# Patient Record
Sex: FEMALE | Race: WHITE | Employment: OTHER
[De-identification: names, ages, dates, MRNs, and addresses within clinical notes are randomized per-mention and may not be internally consistent; named-entity substitution may affect disease eponyms.]

---

## 2017-02-02 ENCOUNTER — OFFICE VISIT (OUTPATIENT)
Dept: PRIMARY CARE CLINIC | Facility: CLINIC | Age: 67
End: 2017-02-02

## 2017-02-02 VITALS
HEIGHT: 66 IN | TEMPERATURE: 97.4 F | SYSTOLIC BLOOD PRESSURE: 139 MMHG | BODY MASS INDEX: 45.48 KG/M2 | HEART RATE: 71 BPM | DIASTOLIC BLOOD PRESSURE: 72 MMHG | RESPIRATION RATE: 18 BRPM | WEIGHT: 283 LBS

## 2017-02-02 DIAGNOSIS — Z23 NEED FOR VACCINATION WITH 13-POLYVALENT PNEUMOCOCCAL CONJUGATE VACCINE: ICD-10-CM

## 2017-02-02 DIAGNOSIS — M75.21 BICEPS TENDINITIS, RIGHT: Primary | ICD-10-CM

## 2017-02-02 PROCEDURE — 99213 OFFICE O/P EST LOW 20 MIN: CPT | Performed by: NURSE PRACTITIONER

## 2017-02-02 PROCEDURE — 3017F COLORECTAL CA SCREEN DOC REV: CPT | Performed by: NURSE PRACTITIONER

## 2017-02-02 PROCEDURE — 1090F PRES/ABSN URINE INCON ASSESS: CPT | Performed by: NURSE PRACTITIONER

## 2017-02-02 PROCEDURE — 90670 PCV13 VACCINE IM: CPT | Performed by: NURSE PRACTITIONER

## 2017-02-02 PROCEDURE — G8427 DOCREV CUR MEDS BY ELIG CLIN: HCPCS | Performed by: NURSE PRACTITIONER

## 2017-02-02 PROCEDURE — G0009 ADMIN PNEUMOCOCCAL VACCINE: HCPCS | Performed by: NURSE PRACTITIONER

## 2017-02-02 PROCEDURE — 1036F TOBACCO NON-USER: CPT | Performed by: NURSE PRACTITIONER

## 2017-02-02 PROCEDURE — G8598 ASA/ANTIPLAT THER USED: HCPCS | Performed by: NURSE PRACTITIONER

## 2017-02-02 PROCEDURE — G8484 FLU IMMUNIZE NO ADMIN: HCPCS | Performed by: NURSE PRACTITIONER

## 2017-02-02 PROCEDURE — G8417 CALC BMI ABV UP PARAM F/U: HCPCS | Performed by: NURSE PRACTITIONER

## 2017-02-02 PROCEDURE — G8399 PT W/DXA RESULTS DOCUMENT: HCPCS | Performed by: NURSE PRACTITIONER

## 2017-02-02 PROCEDURE — 3014F SCREEN MAMMO DOC REV: CPT | Performed by: NURSE PRACTITIONER

## 2017-02-02 PROCEDURE — 1123F ACP DISCUSS/DSCN MKR DOCD: CPT | Performed by: NURSE PRACTITIONER

## 2017-02-02 PROCEDURE — 4040F PNEUMOC VAC/ADMIN/RCVD: CPT | Performed by: NURSE PRACTITIONER

## 2017-02-02 RX ORDER — TRAMADOL HYDROCHLORIDE 50 MG/1
50 TABLET ORAL EVERY 6 HOURS PRN
Qty: 40 TABLET | Refills: 0 | Status: SHIPPED | OUTPATIENT
Start: 2017-02-02 | End: 2017-02-21 | Stop reason: SDUPTHER

## 2017-02-02 RX ORDER — PREDNISONE 10 MG/1
TABLET ORAL
Qty: 10 TABLET | Refills: 0 | Status: SHIPPED | OUTPATIENT
Start: 2017-02-02 | End: 2017-02-21 | Stop reason: ALTCHOICE

## 2017-02-02 ASSESSMENT — ENCOUNTER SYMPTOMS
COUGH: 0
COLOR CHANGE: 0
SHORTNESS OF BREATH: 0

## 2017-02-21 ENCOUNTER — OFFICE VISIT (OUTPATIENT)
Dept: PRIMARY CARE CLINIC | Facility: CLINIC | Age: 67
End: 2017-02-21

## 2017-02-21 ENCOUNTER — TELEPHONE (OUTPATIENT)
Dept: PRIMARY CARE CLINIC | Facility: CLINIC | Age: 67
End: 2017-02-21

## 2017-02-21 VITALS
BODY MASS INDEX: 44.18 KG/M2 | SYSTOLIC BLOOD PRESSURE: 131 MMHG | HEART RATE: 75 BPM | WEIGHT: 274.9 LBS | TEMPERATURE: 97.7 F | HEIGHT: 66 IN | DIASTOLIC BLOOD PRESSURE: 68 MMHG | RESPIRATION RATE: 18 BRPM

## 2017-02-21 DIAGNOSIS — M79.621 PAIN IN RIGHT UPPER ARM: ICD-10-CM

## 2017-02-21 DIAGNOSIS — M75.21 BICEPS TENDINITIS, RIGHT: Primary | ICD-10-CM

## 2017-02-21 PROCEDURE — G8417 CALC BMI ABV UP PARAM F/U: HCPCS | Performed by: NURSE PRACTITIONER

## 2017-02-21 PROCEDURE — G8598 ASA/ANTIPLAT THER USED: HCPCS | Performed by: NURSE PRACTITIONER

## 2017-02-21 PROCEDURE — G8484 FLU IMMUNIZE NO ADMIN: HCPCS | Performed by: NURSE PRACTITIONER

## 2017-02-21 PROCEDURE — 4040F PNEUMOC VAC/ADMIN/RCVD: CPT | Performed by: NURSE PRACTITIONER

## 2017-02-21 PROCEDURE — 1090F PRES/ABSN URINE INCON ASSESS: CPT | Performed by: NURSE PRACTITIONER

## 2017-02-21 PROCEDURE — G8399 PT W/DXA RESULTS DOCUMENT: HCPCS | Performed by: NURSE PRACTITIONER

## 2017-02-21 PROCEDURE — 3017F COLORECTAL CA SCREEN DOC REV: CPT | Performed by: NURSE PRACTITIONER

## 2017-02-21 PROCEDURE — 99213 OFFICE O/P EST LOW 20 MIN: CPT | Performed by: NURSE PRACTITIONER

## 2017-02-21 PROCEDURE — G8427 DOCREV CUR MEDS BY ELIG CLIN: HCPCS | Performed by: NURSE PRACTITIONER

## 2017-02-21 PROCEDURE — 1123F ACP DISCUSS/DSCN MKR DOCD: CPT | Performed by: NURSE PRACTITIONER

## 2017-02-21 PROCEDURE — 1036F TOBACCO NON-USER: CPT | Performed by: NURSE PRACTITIONER

## 2017-02-21 PROCEDURE — 3014F SCREEN MAMMO DOC REV: CPT | Performed by: NURSE PRACTITIONER

## 2017-02-21 RX ORDER — TRAMADOL HYDROCHLORIDE 50 MG/1
50 TABLET ORAL EVERY 6 HOURS PRN
Qty: 40 TABLET | Refills: 1 | Status: SHIPPED | OUTPATIENT
Start: 2017-02-21 | End: 2017-03-03

## 2017-02-21 ASSESSMENT — ENCOUNTER SYMPTOMS
SHORTNESS OF BREATH: 0
COUGH: 0
COLOR CHANGE: 0
BACK PAIN: 0

## 2017-02-22 ENCOUNTER — OFFICE VISIT (OUTPATIENT)
Dept: ONCOLOGY | Facility: CLINIC | Age: 67
End: 2017-02-22

## 2017-02-22 VITALS
WEIGHT: 274 LBS | TEMPERATURE: 97.2 F | HEART RATE: 69 BPM | HEIGHT: 66 IN | RESPIRATION RATE: 22 BRPM | SYSTOLIC BLOOD PRESSURE: 136 MMHG | DIASTOLIC BLOOD PRESSURE: 69 MMHG | BODY MASS INDEX: 44.03 KG/M2

## 2017-02-22 DIAGNOSIS — R97.0 ELEVATED CEA: ICD-10-CM

## 2017-02-22 DIAGNOSIS — N13.5 URETERAL STRICTURE, RIGHT: ICD-10-CM

## 2017-02-22 DIAGNOSIS — Z85.048 HISTORY OF RECTAL CANCER: Primary | ICD-10-CM

## 2017-02-22 DIAGNOSIS — C20 RECTAL CANCER (HCC): ICD-10-CM

## 2017-02-22 PROCEDURE — G8399 PT W/DXA RESULTS DOCUMENT: HCPCS | Performed by: INTERNAL MEDICINE

## 2017-02-22 PROCEDURE — 1036F TOBACCO NON-USER: CPT | Performed by: INTERNAL MEDICINE

## 2017-02-22 PROCEDURE — G8427 DOCREV CUR MEDS BY ELIG CLIN: HCPCS | Performed by: INTERNAL MEDICINE

## 2017-02-22 PROCEDURE — G8598 ASA/ANTIPLAT THER USED: HCPCS | Performed by: INTERNAL MEDICINE

## 2017-02-22 PROCEDURE — 1090F PRES/ABSN URINE INCON ASSESS: CPT | Performed by: INTERNAL MEDICINE

## 2017-02-22 PROCEDURE — 3017F COLORECTAL CA SCREEN DOC REV: CPT | Performed by: INTERNAL MEDICINE

## 2017-02-22 PROCEDURE — 4040F PNEUMOC VAC/ADMIN/RCVD: CPT | Performed by: INTERNAL MEDICINE

## 2017-02-22 PROCEDURE — 3014F SCREEN MAMMO DOC REV: CPT | Performed by: INTERNAL MEDICINE

## 2017-02-22 PROCEDURE — 99214 OFFICE O/P EST MOD 30 MIN: CPT | Performed by: INTERNAL MEDICINE

## 2017-02-22 PROCEDURE — G8484 FLU IMMUNIZE NO ADMIN: HCPCS | Performed by: INTERNAL MEDICINE

## 2017-02-22 PROCEDURE — G8417 CALC BMI ABV UP PARAM F/U: HCPCS | Performed by: INTERNAL MEDICINE

## 2017-02-22 PROCEDURE — 1123F ACP DISCUSS/DSCN MKR DOCD: CPT | Performed by: INTERNAL MEDICINE

## 2017-02-28 RX ORDER — OSTOMY IRRIGATOR 1 3/4"
EACH MISCELLANEOUS
Qty: 20 BOTTLE | Refills: 5 | Status: SHIPPED | OUTPATIENT
Start: 2017-02-28 | End: 2017-10-05 | Stop reason: SDUPTHER

## 2017-02-28 RX ORDER — OSTOMY SUPPLY 2 1/4"
EACH MISCELLANEOUS
Qty: 20 EACH | Refills: 5 | Status: SHIPPED | OUTPATIENT
Start: 2017-02-28 | End: 2017-10-05 | Stop reason: SDUPTHER

## 2017-03-15 ENCOUNTER — HOSPITAL ENCOUNTER (OUTPATIENT)
Age: 67
Setting detail: SPECIMEN
Discharge: HOME | End: 2017-03-15
Payer: MEDICARE

## 2017-03-15 ENCOUNTER — HOSPITAL ENCOUNTER (OUTPATIENT)
Dept: ULTRASOUND IMAGING | Age: 67
Discharge: HOME | End: 2017-03-15
Attending: PHYSICIAN ASSISTANT | Admitting: PHYSICIAN ASSISTANT
Payer: MEDICARE

## 2017-03-15 DIAGNOSIS — N13.30 HYDROURETERONEPHROSIS: ICD-10-CM

## 2017-03-15 DIAGNOSIS — N18.9 CHRONIC KIDNEY DISEASE, UNSPECIFIED STAGE: ICD-10-CM

## 2017-03-15 DIAGNOSIS — C20 RECTAL CANCER (HCC): ICD-10-CM

## 2017-03-15 LAB
ANION GAP SERPL CALCULATED.3IONS-SCNC: 15 MMOL/L (ref 9–17)
BUN BLDV-MCNC: 23 MG/DL (ref 8–23)
BUN/CREAT BLD: 21 (ref 9–20)
CALCIUM SERPL-MCNC: 9.6 MG/DL (ref 8.6–10.4)
CHLORIDE BLD-SCNC: 101 MMOL/L (ref 98–107)
CO2: 25 MMOL/L (ref 20–31)
CREAT SERPL-MCNC: 1.12 MG/DL (ref 0.5–0.9)
GFR AFRICAN AMERICAN: 59 ML/MIN
GFR NON-AFRICAN AMERICAN: 48 ML/MIN
GFR SERPL CREATININE-BSD FRML MDRD: ABNORMAL ML/MIN/{1.73_M2}
GFR SERPL CREATININE-BSD FRML MDRD: ABNORMAL ML/MIN/{1.73_M2}
GLUCOSE BLD-MCNC: 106 MG/DL (ref 70–99)
POTASSIUM SERPL-SCNC: 3.9 MMOL/L (ref 3.7–5.3)
SODIUM BLD-SCNC: 141 MMOL/L (ref 135–144)

## 2017-03-15 PROCEDURE — 80048 BASIC METABOLIC PNL TOTAL CA: CPT

## 2017-03-15 PROCEDURE — 76770 US EXAM ABDO BACK WALL COMP: CPT

## 2017-03-15 PROCEDURE — 36415 COLL VENOUS BLD VENIPUNCTURE: CPT

## 2017-03-22 ENCOUNTER — OFFICE VISIT (OUTPATIENT)
Dept: UROLOGY | Age: 67
End: 2017-03-22
Payer: MEDICARE

## 2017-03-22 ENCOUNTER — HOSPITAL ENCOUNTER (OUTPATIENT)
Age: 67
Setting detail: SPECIMEN
Discharge: HOME | End: 2017-03-22
Payer: MEDICARE

## 2017-03-22 VITALS
SYSTOLIC BLOOD PRESSURE: 122 MMHG | BODY MASS INDEX: 43.39 KG/M2 | WEIGHT: 270 LBS | DIASTOLIC BLOOD PRESSURE: 82 MMHG | HEIGHT: 66 IN

## 2017-03-22 DIAGNOSIS — R31.29 MICROHEMATURIA: ICD-10-CM

## 2017-03-22 DIAGNOSIS — N13.5 URETERAL OBSTRUCTION, RIGHT: ICD-10-CM

## 2017-03-22 DIAGNOSIS — C18.9 MALIGNANT NEOPLASM OF COLON, UNSPECIFIED PART OF COLON (HCC): ICD-10-CM

## 2017-03-22 DIAGNOSIS — N13.30 HYDROURETERONEPHROSIS: Primary | ICD-10-CM

## 2017-03-22 PROCEDURE — 1036F TOBACCO NON-USER: CPT | Performed by: PHYSICIAN ASSISTANT

## 2017-03-22 PROCEDURE — G8484 FLU IMMUNIZE NO ADMIN: HCPCS | Performed by: PHYSICIAN ASSISTANT

## 2017-03-22 PROCEDURE — G8417 CALC BMI ABV UP PARAM F/U: HCPCS | Performed by: PHYSICIAN ASSISTANT

## 2017-03-22 PROCEDURE — 4040F PNEUMOC VAC/ADMIN/RCVD: CPT | Performed by: PHYSICIAN ASSISTANT

## 2017-03-22 PROCEDURE — G8399 PT W/DXA RESULTS DOCUMENT: HCPCS | Performed by: PHYSICIAN ASSISTANT

## 2017-03-22 PROCEDURE — 88305 TISSUE EXAM BY PATHOLOGIST: CPT

## 2017-03-22 PROCEDURE — 3017F COLORECTAL CA SCREEN DOC REV: CPT | Performed by: PHYSICIAN ASSISTANT

## 2017-03-22 PROCEDURE — 1090F PRES/ABSN URINE INCON ASSESS: CPT | Performed by: PHYSICIAN ASSISTANT

## 2017-03-22 PROCEDURE — 88112 CYTOPATH CELL ENHANCE TECH: CPT

## 2017-03-22 PROCEDURE — G8598 ASA/ANTIPLAT THER USED: HCPCS | Performed by: PHYSICIAN ASSISTANT

## 2017-03-22 PROCEDURE — 1123F ACP DISCUSS/DSCN MKR DOCD: CPT | Performed by: PHYSICIAN ASSISTANT

## 2017-03-22 PROCEDURE — G8427 DOCREV CUR MEDS BY ELIG CLIN: HCPCS | Performed by: PHYSICIAN ASSISTANT

## 2017-03-22 PROCEDURE — 99214 OFFICE O/P EST MOD 30 MIN: CPT | Performed by: PHYSICIAN ASSISTANT

## 2017-03-22 PROCEDURE — 3014F SCREEN MAMMO DOC REV: CPT | Performed by: PHYSICIAN ASSISTANT

## 2017-03-22 ASSESSMENT — ENCOUNTER SYMPTOMS
CONSTIPATION: 0
VOMITING: 0
COUGH: 0
SHORTNESS OF BREATH: 0
ABDOMINAL PAIN: 0
EYE PAIN: 0
ABDOMINAL DISTENTION: 0
NAUSEA: 0
DIARRHEA: 0

## 2017-03-23 LAB
CASE NUMBER:: NORMAL
SPECIMEN DESCRIPTION: NORMAL
SURGICAL PATHOLOGY REPORT: NORMAL

## 2017-03-30 ENCOUNTER — HOSPITAL ENCOUNTER (OUTPATIENT)
Age: 67
Discharge: HOME | End: 2017-03-30
Payer: MEDICARE

## 2017-03-30 LAB
-: ABNORMAL
ALBUMIN SERPL-MCNC: 3.8 G/DL (ref 3.5–5.2)
AMORPHOUS: ABNORMAL
ANION GAP SERPL CALCULATED.3IONS-SCNC: 12 MMOL/L (ref 9–17)
BACTERIA: ABNORMAL
BILIRUBIN URINE: NEGATIVE
BUN BLDV-MCNC: 21 MG/DL (ref 8–23)
BUN/CREAT BLD: 23 (ref 9–20)
CALCIUM SERPL-MCNC: 9.2 MG/DL (ref 8.6–10.4)
CASTS UA: ABNORMAL /LPF
CHLORIDE BLD-SCNC: 103 MMOL/L (ref 98–107)
CO2: 25 MMOL/L (ref 20–31)
COLOR: YELLOW
COMMENT UA: ABNORMAL
CREAT SERPL-MCNC: 0.93 MG/DL (ref 0.5–0.9)
CREATININE URINE: 105.2 MG/DL (ref 28–217)
CRYSTALS, UA: ABNORMAL /HPF
EPITHELIAL CELLS UA: ABNORMAL /HPF (ref 0–25)
GFR AFRICAN AMERICAN: >60 ML/MIN
GFR NON-AFRICAN AMERICAN: 60 ML/MIN
GFR SERPL CREATININE-BSD FRML MDRD: ABNORMAL ML/MIN/{1.73_M2}
GFR SERPL CREATININE-BSD FRML MDRD: ABNORMAL ML/MIN/{1.73_M2}
GLUCOSE BLD-MCNC: 110 MG/DL (ref 70–99)
GLUCOSE URINE: NEGATIVE
HCT VFR BLD CALC: 41.4 % (ref 36–46)
HEMOGLOBIN: 13.2 G/DL (ref 12–16)
KETONES, URINE: NEGATIVE
LEUKOCYTE ESTERASE, URINE: ABNORMAL
MAGNESIUM: 1.8 MG/DL (ref 1.6–2.6)
MCH RBC QN AUTO: 24.8 PG (ref 26–34)
MCHC RBC AUTO-ENTMCNC: 31.8 G/DL (ref 31–37)
MCV RBC AUTO: 78 FL (ref 80–100)
MUCUS: ABNORMAL
NITRITE, URINE: NEGATIVE
OTHER OBSERVATIONS UA: ABNORMAL
PDW BLD-RTO: 16.6 % (ref 12.1–15.2)
PH UA: 5.5 (ref 5–9)
PHOSPHORUS: 3.4 MG/DL (ref 2.6–4.5)
PLATELET # BLD: 193 K/UL (ref 140–450)
PMV BLD AUTO: ABNORMAL FL (ref 6–12)
POTASSIUM SERPL-SCNC: 4 MMOL/L (ref 3.7–5.3)
PROTEIN UA: NEGATIVE
PTH INTACT: 79.88 PG/ML (ref 15–65)
RBC # BLD: 5.31 M/UL (ref 4–5.2)
RBC UA: ABNORMAL /HPF (ref 0–2)
RENAL EPITHELIAL, UA: ABNORMAL /HPF
SODIUM BLD-SCNC: 140 MMOL/L (ref 135–144)
SPECIFIC GRAVITY UA: 1.02 (ref 1.01–1.02)
TOTAL PROTEIN, URINE: 13 MG/DL
TRICHOMONAS: ABNORMAL
TURBIDITY: CLEAR
URIC ACID: 5.7 MG/DL (ref 2.4–5.7)
URINE HGB: ABNORMAL
UROBILINOGEN, URINE: NORMAL
WBC # BLD: 8.4 K/UL (ref 3.5–11)
WBC UA: ABNORMAL /HPF (ref 0–5)
YEAST: ABNORMAL

## 2017-03-30 PROCEDURE — 85027 COMPLETE CBC AUTOMATED: CPT

## 2017-03-30 PROCEDURE — 84550 ASSAY OF BLOOD/URIC ACID: CPT

## 2017-03-30 PROCEDURE — 80069 RENAL FUNCTION PANEL: CPT

## 2017-03-30 PROCEDURE — 83970 ASSAY OF PARATHORMONE: CPT

## 2017-03-30 PROCEDURE — 83735 ASSAY OF MAGNESIUM: CPT

## 2017-03-30 PROCEDURE — 84156 ASSAY OF PROTEIN URINE: CPT

## 2017-03-30 PROCEDURE — 82570 ASSAY OF URINE CREATININE: CPT

## 2017-03-30 PROCEDURE — 36415 COLL VENOUS BLD VENIPUNCTURE: CPT

## 2017-03-30 PROCEDURE — 81001 URINALYSIS AUTO W/SCOPE: CPT

## 2017-04-03 ENCOUNTER — PROCEDURE VISIT (OUTPATIENT)
Dept: UROLOGY | Age: 67
End: 2017-04-03
Payer: MEDICARE

## 2017-04-03 VITALS — BODY MASS INDEX: 43.58 KG/M2 | DIASTOLIC BLOOD PRESSURE: 80 MMHG | SYSTOLIC BLOOD PRESSURE: 138 MMHG | WEIGHT: 270 LBS

## 2017-04-03 DIAGNOSIS — N13.5 URETERAL OBSTRUCTION, RIGHT: ICD-10-CM

## 2017-04-03 DIAGNOSIS — N18.9 CHRONIC KIDNEY DISEASE, UNSPECIFIED STAGE: ICD-10-CM

## 2017-04-03 DIAGNOSIS — N13.30 HYDROURETERONEPHROSIS: ICD-10-CM

## 2017-04-03 DIAGNOSIS — R31.29 MICROHEMATURIA: Primary | ICD-10-CM

## 2017-04-03 PROCEDURE — 99213 OFFICE O/P EST LOW 20 MIN: CPT | Performed by: UROLOGY

## 2017-04-03 PROCEDURE — G8427 DOCREV CUR MEDS BY ELIG CLIN: HCPCS | Performed by: UROLOGY

## 2017-04-03 PROCEDURE — 1123F ACP DISCUSS/DSCN MKR DOCD: CPT | Performed by: UROLOGY

## 2017-04-03 PROCEDURE — G8399 PT W/DXA RESULTS DOCUMENT: HCPCS | Performed by: UROLOGY

## 2017-04-03 PROCEDURE — 3014F SCREEN MAMMO DOC REV: CPT | Performed by: UROLOGY

## 2017-04-03 PROCEDURE — 52000 CYSTOURETHROSCOPY: CPT | Performed by: UROLOGY

## 2017-04-03 PROCEDURE — G8598 ASA/ANTIPLAT THER USED: HCPCS | Performed by: UROLOGY

## 2017-04-03 PROCEDURE — 3017F COLORECTAL CA SCREEN DOC REV: CPT | Performed by: UROLOGY

## 2017-04-03 PROCEDURE — 4040F PNEUMOC VAC/ADMIN/RCVD: CPT | Performed by: UROLOGY

## 2017-04-03 PROCEDURE — 1090F PRES/ABSN URINE INCON ASSESS: CPT | Performed by: UROLOGY

## 2017-04-03 PROCEDURE — G8417 CALC BMI ABV UP PARAM F/U: HCPCS | Performed by: UROLOGY

## 2017-04-03 PROCEDURE — 1036F TOBACCO NON-USER: CPT | Performed by: UROLOGY

## 2017-04-03 ASSESSMENT — ENCOUNTER SYMPTOMS
EYE PAIN: 0
BACK PAIN: 0
SHORTNESS OF BREATH: 0
EYE REDNESS: 0
VOMITING: 0
COUGH: 0
COLOR CHANGE: 0
ABDOMINAL PAIN: 0
WHEEZING: 0
NAUSEA: 0

## 2017-04-12 ENCOUNTER — HOSPITAL ENCOUNTER (OUTPATIENT)
Age: 67
Discharge: HOME | End: 2017-04-12
Payer: MEDICARE

## 2017-04-12 DIAGNOSIS — I10 ESSENTIAL HYPERTENSION: ICD-10-CM

## 2017-04-12 DIAGNOSIS — E78.2 MIXED HYPERLIPIDEMIA: ICD-10-CM

## 2017-04-12 LAB
ABSOLUTE EOS #: 0.1 K/UL (ref 0–0.4)
ABSOLUTE LYMPH #: 1.8 K/UL (ref 1–4.8)
ABSOLUTE MONO #: 0.7 K/UL (ref 0–1)
ALBUMIN SERPL-MCNC: 4 G/DL (ref 3.5–5.2)
ALBUMIN/GLOBULIN RATIO: 1.4 (ref 1–2.5)
ALP BLD-CCNC: 117 U/L (ref 35–104)
ALT SERPL-CCNC: 15 U/L (ref 5–33)
ANION GAP SERPL CALCULATED.3IONS-SCNC: 13 MMOL/L (ref 9–17)
AST SERPL-CCNC: 14 U/L
BASOPHILS # BLD: 1 % (ref 0–2)
BASOPHILS ABSOLUTE: 0.1 K/UL (ref 0–0.2)
BILIRUB SERPL-MCNC: 0.44 MG/DL (ref 0.3–1.2)
BUN BLDV-MCNC: 27 MG/DL (ref 8–23)
BUN/CREAT BLD: 26 (ref 9–20)
CALCIUM SERPL-MCNC: 9.1 MG/DL (ref 8.6–10.4)
CHLORIDE BLD-SCNC: 101 MMOL/L (ref 98–107)
CHOLESTEROL/HDL RATIO: 2.4
CHOLESTEROL: 159 MG/DL
CO2: 28 MMOL/L (ref 20–31)
CREAT SERPL-MCNC: 1.02 MG/DL (ref 0.5–0.9)
CREATININE URINE: 108.9 MG/DL (ref 28–217)
DIFFERENTIAL TYPE: ABNORMAL
EOSINOPHILS RELATIVE PERCENT: 1 % (ref 0–8)
GFR AFRICAN AMERICAN: >60 ML/MIN
GFR NON-AFRICAN AMERICAN: 54 ML/MIN
GFR SERPL CREATININE-BSD FRML MDRD: ABNORMAL ML/MIN/{1.73_M2}
GFR SERPL CREATININE-BSD FRML MDRD: ABNORMAL ML/MIN/{1.73_M2}
GLUCOSE BLD-MCNC: 90 MG/DL (ref 70–99)
HCT VFR BLD CALC: 46.1 % (ref 36–46)
HDLC SERPL-MCNC: 65 MG/DL
HEMOGLOBIN: 14.7 G/DL (ref 12–16)
LDL CHOLESTEROL: 78 MG/DL (ref 0–130)
LYMPHOCYTES # BLD: 15 % (ref 24–44)
MCH RBC QN AUTO: 25.1 PG (ref 26–34)
MCHC RBC AUTO-ENTMCNC: 31.9 G/DL (ref 31–37)
MCV RBC AUTO: 78.6 FL (ref 80–100)
MICROALBUMIN/CREAT 24H UR: <12 MG/L
MICROALBUMIN/CREAT UR-RTO: 11 MCG/MG CREAT
MONOCYTES # BLD: 6 % (ref 0–12)
PDW BLD-RTO: 16.5 % (ref 12.1–15.2)
PLATELET # BLD: 239 K/UL (ref 140–450)
PLATELET ESTIMATE: ABNORMAL
PMV BLD AUTO: ABNORMAL FL (ref 6–12)
POTASSIUM SERPL-SCNC: 3.8 MMOL/L (ref 3.7–5.3)
RBC # BLD: 5.87 M/UL (ref 4–5.2)
RBC # BLD: ABNORMAL 10*6/UL
SEG NEUTROPHILS: 77 % (ref 36–66)
SEGMENTED NEUTROPHILS ABSOLUTE COUNT: 9 K/UL (ref 1.8–7.7)
SODIUM BLD-SCNC: 142 MMOL/L (ref 135–144)
TOTAL PROTEIN: 6.9 G/DL (ref 6.4–8.3)
TRIGL SERPL-MCNC: 82 MG/DL
VLDLC SERPL CALC-MCNC: NORMAL MG/DL (ref 1–30)
WBC # BLD: 11.7 K/UL (ref 3.5–11)
WBC # BLD: ABNORMAL 10*3/UL

## 2017-04-12 PROCEDURE — 85025 COMPLETE CBC W/AUTO DIFF WBC: CPT

## 2017-04-12 PROCEDURE — 80061 LIPID PANEL: CPT

## 2017-04-12 PROCEDURE — 82043 UR ALBUMIN QUANTITATIVE: CPT

## 2017-04-12 PROCEDURE — 36415 COLL VENOUS BLD VENIPUNCTURE: CPT

## 2017-04-12 PROCEDURE — 80053 COMPREHEN METABOLIC PANEL: CPT

## 2017-04-12 PROCEDURE — 82570 ASSAY OF URINE CREATININE: CPT

## 2017-04-26 ENCOUNTER — HOSPITAL ENCOUNTER (EMERGENCY)
Age: 67
Discharge: HOME | End: 2017-04-26
Payer: MEDICARE

## 2017-04-26 VITALS
WEIGHT: 271 LBS | TEMPERATURE: 96.5 F | OXYGEN SATURATION: 98 % | BODY MASS INDEX: 43.74 KG/M2 | RESPIRATION RATE: 18 BRPM | DIASTOLIC BLOOD PRESSURE: 79 MMHG | HEART RATE: 93 BPM | SYSTOLIC BLOOD PRESSURE: 129 MMHG

## 2017-04-26 DIAGNOSIS — G89.29 OTHER CHRONIC PAIN: ICD-10-CM

## 2017-04-26 DIAGNOSIS — M79.601 RIGHT ARM PAIN: Primary | ICD-10-CM

## 2017-04-26 PROCEDURE — 99283 EMERGENCY DEPT VISIT LOW MDM: CPT

## 2017-04-26 RX ORDER — HYDROCODONE BITARTRATE AND ACETAMINOPHEN 5; 325 MG/1; MG/1
1 TABLET ORAL EVERY 8 HOURS PRN
Qty: 10 TABLET | Refills: 0 | Status: SHIPPED | OUTPATIENT
Start: 2017-04-26 | End: 2017-05-03 | Stop reason: ALTCHOICE

## 2017-04-26 ASSESSMENT — PAIN DESCRIPTION - ORIENTATION: ORIENTATION: RIGHT

## 2017-04-26 ASSESSMENT — PAIN DESCRIPTION - LOCATION: LOCATION: ARM

## 2017-04-26 ASSESSMENT — PAIN DESCRIPTION - PAIN TYPE: TYPE: ACUTE PAIN

## 2017-04-26 ASSESSMENT — PAIN DESCRIPTION - DESCRIPTORS: DESCRIPTORS: CONSTANT;ACHING

## 2017-04-26 ASSESSMENT — PAIN SCALES - GENERAL: PAINLEVEL_OUTOF10: 6

## 2017-04-27 ENCOUNTER — TELEPHONE (OUTPATIENT)
Dept: PRIMARY CARE CLINIC | Age: 67
End: 2017-04-27

## 2017-04-28 ASSESSMENT — ENCOUNTER SYMPTOMS
COUGH: 0
RHINORRHEA: 0
DIARRHEA: 0
VOMITING: 0
ABDOMINAL PAIN: 0
WHEEZING: 0
SORE THROAT: 0
EYE DISCHARGE: 0
SHORTNESS OF BREATH: 0
EYE REDNESS: 0
CHEST TIGHTNESS: 0
BLOOD IN STOOL: 0
NAUSEA: 0
CONSTIPATION: 0
BACK PAIN: 0

## 2017-05-03 ENCOUNTER — HOSPITAL ENCOUNTER (OUTPATIENT)
Dept: PHYSICAL THERAPY | Age: 67
Setting detail: THERAPIES SERIES
Discharge: HOME | End: 2017-05-03
Payer: MEDICARE

## 2017-05-03 ENCOUNTER — OFFICE VISIT (OUTPATIENT)
Dept: PRIMARY CARE CLINIC | Age: 67
End: 2017-05-03
Payer: MEDICARE

## 2017-05-03 VITALS
SYSTOLIC BLOOD PRESSURE: 107 MMHG | RESPIRATION RATE: 18 BRPM | TEMPERATURE: 96.4 F | DIASTOLIC BLOOD PRESSURE: 82 MMHG | BODY MASS INDEX: 43.84 KG/M2 | HEART RATE: 97 BPM | WEIGHT: 272.8 LBS | HEIGHT: 66 IN

## 2017-05-03 DIAGNOSIS — I10 ESSENTIAL HYPERTENSION: Primary | ICD-10-CM

## 2017-05-03 DIAGNOSIS — E66.01 MORBID (SEVERE) OBESITY DUE TO EXCESS CALORIES (HCC): ICD-10-CM

## 2017-05-03 DIAGNOSIS — E78.2 MIXED HYPERLIPIDEMIA: ICD-10-CM

## 2017-05-03 DIAGNOSIS — E66.01 MORBID OBESITY WITH BMI OF 40.0-44.9, ADULT (HCC): ICD-10-CM

## 2017-05-03 DIAGNOSIS — F41.9 ANXIETY: ICD-10-CM

## 2017-05-03 PROCEDURE — 97140 MANUAL THERAPY 1/> REGIONS: CPT

## 2017-05-03 PROCEDURE — 1036F TOBACCO NON-USER: CPT | Performed by: NURSE PRACTITIONER

## 2017-05-03 PROCEDURE — 99214 OFFICE O/P EST MOD 30 MIN: CPT | Performed by: NURSE PRACTITIONER

## 2017-05-03 PROCEDURE — G8417 CALC BMI ABV UP PARAM F/U: HCPCS | Performed by: NURSE PRACTITIONER

## 2017-05-03 PROCEDURE — 3014F SCREEN MAMMO DOC REV: CPT | Performed by: NURSE PRACTITIONER

## 2017-05-03 PROCEDURE — 1090F PRES/ABSN URINE INCON ASSESS: CPT | Performed by: NURSE PRACTITIONER

## 2017-05-03 PROCEDURE — G8399 PT W/DXA RESULTS DOCUMENT: HCPCS | Performed by: NURSE PRACTITIONER

## 2017-05-03 PROCEDURE — G8984 CARRY CURRENT STATUS: HCPCS

## 2017-05-03 PROCEDURE — 97162 PT EVAL MOD COMPLEX 30 MIN: CPT

## 2017-05-03 PROCEDURE — G8985 CARRY GOAL STATUS: HCPCS

## 2017-05-03 PROCEDURE — 3017F COLORECTAL CA SCREEN DOC REV: CPT | Performed by: NURSE PRACTITIONER

## 2017-05-03 PROCEDURE — G8428 CUR MEDS NOT DOCUMENT: HCPCS | Performed by: NURSE PRACTITIONER

## 2017-05-03 PROCEDURE — G8598 ASA/ANTIPLAT THER USED: HCPCS | Performed by: NURSE PRACTITIONER

## 2017-05-03 PROCEDURE — 4040F PNEUMOC VAC/ADMIN/RCVD: CPT | Performed by: NURSE PRACTITIONER

## 2017-05-03 PROCEDURE — 1123F ACP DISCUSS/DSCN MKR DOCD: CPT | Performed by: NURSE PRACTITIONER

## 2017-05-03 RX ORDER — HYDROCHLOROTHIAZIDE 12.5 MG/1
12.5 CAPSULE, GELATIN COATED ORAL DAILY
Qty: 90 CAPSULE | Refills: 3 | Status: SHIPPED | OUTPATIENT
Start: 2017-05-03 | End: 2018-04-26 | Stop reason: SDUPTHER

## 2017-05-03 RX ORDER — LOSARTAN POTASSIUM 100 MG/1
100 TABLET ORAL DAILY
Qty: 90 TABLET | Refills: 3 | Status: SHIPPED | OUTPATIENT
Start: 2017-05-03 | End: 2018-04-26 | Stop reason: SDUPTHER

## 2017-05-03 RX ORDER — CLOPIDOGREL BISULFATE 75 MG/1
75 TABLET ORAL DAILY
Qty: 90 TABLET | Refills: 3 | Status: SHIPPED | OUTPATIENT
Start: 2017-05-03 | End: 2018-04-26 | Stop reason: SDUPTHER

## 2017-05-03 RX ORDER — CITALOPRAM 20 MG/1
20 TABLET ORAL DAILY
Qty: 90 TABLET | Refills: 3 | Status: SHIPPED | OUTPATIENT
Start: 2017-05-03 | End: 2018-03-09 | Stop reason: SDUPTHER

## 2017-05-03 ASSESSMENT — ENCOUNTER SYMPTOMS
VOMITING: 0
ABDOMINAL PAIN: 0
COUGH: 0
DIARRHEA: 0
SORE THROAT: 0
RHINORRHEA: 0
WHEEZING: 0
NAUSEA: 0
CONSTIPATION: 0

## 2017-05-03 ASSESSMENT — PAIN SCALES - GENERAL: PAINLEVEL_OUTOF10: 2

## 2017-05-03 ASSESSMENT — PAIN DESCRIPTION - ORIENTATION: ORIENTATION: RIGHT

## 2017-05-03 ASSESSMENT — PAIN DESCRIPTION - DESCRIPTORS: DESCRIPTORS: ACHING

## 2017-05-03 ASSESSMENT — PAIN DESCRIPTION - PAIN TYPE: TYPE: ACUTE PAIN

## 2017-05-03 ASSESSMENT — PAIN DESCRIPTION - LOCATION: LOCATION: SHOULDER

## 2017-05-04 ENCOUNTER — HOSPITAL ENCOUNTER (OUTPATIENT)
Dept: PHYSICAL THERAPY | Age: 67
Setting detail: THERAPIES SERIES
Discharge: HOME | End: 2017-05-04
Payer: MEDICARE

## 2017-05-04 PROCEDURE — 97110 THERAPEUTIC EXERCISES: CPT

## 2017-05-04 PROCEDURE — 97140 MANUAL THERAPY 1/> REGIONS: CPT

## 2017-05-11 ENCOUNTER — HOSPITAL ENCOUNTER (OUTPATIENT)
Dept: PHYSICAL THERAPY | Age: 67
Setting detail: THERAPIES SERIES
Discharge: HOME | End: 2017-05-11
Payer: MEDICARE

## 2017-05-11 PROCEDURE — 97035 APP MDLTY 1+ULTRASOUND EA 15: CPT

## 2017-05-11 PROCEDURE — 97140 MANUAL THERAPY 1/> REGIONS: CPT

## 2017-05-11 PROCEDURE — 97110 THERAPEUTIC EXERCISES: CPT

## 2017-05-11 ASSESSMENT — PAIN DESCRIPTION - LOCATION: LOCATION: SHOULDER

## 2017-05-11 ASSESSMENT — PAIN DESCRIPTION - ORIENTATION: ORIENTATION: RIGHT

## 2017-05-11 ASSESSMENT — PAIN SCALES - GENERAL: PAINLEVEL_OUTOF10: 1

## 2017-05-12 ENCOUNTER — HOSPITAL ENCOUNTER (OUTPATIENT)
Dept: PHYSICAL THERAPY | Age: 67
Setting detail: THERAPIES SERIES
Discharge: HOME | End: 2017-05-12
Payer: MEDICARE

## 2017-05-12 PROCEDURE — 97140 MANUAL THERAPY 1/> REGIONS: CPT

## 2017-05-12 PROCEDURE — 97110 THERAPEUTIC EXERCISES: CPT

## 2017-05-12 PROCEDURE — 97035 APP MDLTY 1+ULTRASOUND EA 15: CPT

## 2017-05-12 ASSESSMENT — PAIN DESCRIPTION - ORIENTATION: ORIENTATION: RIGHT

## 2017-05-12 ASSESSMENT — PAIN DESCRIPTION - LOCATION: LOCATION: SHOULDER

## 2017-05-12 ASSESSMENT — PAIN SCALES - GENERAL: PAINLEVEL_OUTOF10: 1

## 2017-05-15 ENCOUNTER — HOSPITAL ENCOUNTER (OUTPATIENT)
Dept: PHYSICAL THERAPY | Age: 67
Setting detail: THERAPIES SERIES
Discharge: HOME | End: 2017-05-15
Payer: MEDICARE

## 2017-05-15 PROCEDURE — 97035 APP MDLTY 1+ULTRASOUND EA 15: CPT

## 2017-05-15 PROCEDURE — 97110 THERAPEUTIC EXERCISES: CPT

## 2017-05-15 PROCEDURE — 97140 MANUAL THERAPY 1/> REGIONS: CPT

## 2017-05-17 ENCOUNTER — HOSPITAL ENCOUNTER (OUTPATIENT)
Dept: PHYSICAL THERAPY | Age: 67
Setting detail: THERAPIES SERIES
Discharge: HOME | End: 2017-05-17
Payer: MEDICARE

## 2017-05-17 PROCEDURE — 97140 MANUAL THERAPY 1/> REGIONS: CPT

## 2017-05-17 PROCEDURE — 97110 THERAPEUTIC EXERCISES: CPT

## 2017-05-19 ENCOUNTER — HOSPITAL ENCOUNTER (OUTPATIENT)
Dept: PHYSICAL THERAPY | Age: 67
Setting detail: THERAPIES SERIES
Discharge: HOME | End: 2017-05-19
Payer: MEDICARE

## 2017-05-19 PROCEDURE — G8985 CARRY GOAL STATUS: HCPCS

## 2017-05-19 PROCEDURE — 97140 MANUAL THERAPY 1/> REGIONS: CPT

## 2017-05-19 PROCEDURE — G8984 CARRY CURRENT STATUS: HCPCS

## 2017-05-19 PROCEDURE — 97110 THERAPEUTIC EXERCISES: CPT

## 2017-05-22 ENCOUNTER — HOSPITAL ENCOUNTER (OUTPATIENT)
Dept: PHYSICAL THERAPY | Age: 67
Setting detail: THERAPIES SERIES
Discharge: HOME | End: 2017-05-22
Payer: MEDICARE

## 2017-05-22 PROCEDURE — 97140 MANUAL THERAPY 1/> REGIONS: CPT

## 2017-05-22 PROCEDURE — 97110 THERAPEUTIC EXERCISES: CPT

## 2017-05-24 ENCOUNTER — HOSPITAL ENCOUNTER (OUTPATIENT)
Dept: PHYSICAL THERAPY | Age: 67
Setting detail: THERAPIES SERIES
Discharge: HOME | End: 2017-05-24
Payer: MEDICARE

## 2017-05-24 PROCEDURE — 97140 MANUAL THERAPY 1/> REGIONS: CPT

## 2017-05-24 PROCEDURE — 97110 THERAPEUTIC EXERCISES: CPT

## 2017-05-24 ASSESSMENT — PAIN DESCRIPTION - PAIN TYPE: TYPE: ACUTE PAIN

## 2017-05-24 ASSESSMENT — PAIN DESCRIPTION - LOCATION: LOCATION: SHOULDER

## 2017-05-24 ASSESSMENT — PAIN DESCRIPTION - DESCRIPTORS: DESCRIPTORS: ACHING

## 2017-05-24 ASSESSMENT — PAIN SCALES - GENERAL: PAINLEVEL_OUTOF10: 1

## 2017-05-24 ASSESSMENT — PAIN DESCRIPTION - ORIENTATION: ORIENTATION: RIGHT

## 2017-05-25 ENCOUNTER — HOSPITAL ENCOUNTER (OUTPATIENT)
Dept: PHYSICAL THERAPY | Age: 67
Setting detail: THERAPIES SERIES
End: 2017-05-25
Payer: MEDICARE

## 2017-05-30 ENCOUNTER — HOSPITAL ENCOUNTER (OUTPATIENT)
Dept: PHYSICAL THERAPY | Age: 67
Setting detail: THERAPIES SERIES
Discharge: HOME | End: 2017-05-30
Payer: MEDICARE

## 2017-05-30 PROCEDURE — 97140 MANUAL THERAPY 1/> REGIONS: CPT

## 2017-05-30 PROCEDURE — 97110 THERAPEUTIC EXERCISES: CPT

## 2017-05-30 ASSESSMENT — PAIN DESCRIPTION - DESCRIPTORS: DESCRIPTORS: ACHING

## 2017-05-30 ASSESSMENT — PAIN SCALES - GENERAL: PAINLEVEL_OUTOF10: 0

## 2017-05-30 ASSESSMENT — PAIN DESCRIPTION - PAIN TYPE: TYPE: ACUTE PAIN

## 2017-05-30 ASSESSMENT — PAIN DESCRIPTION - LOCATION: LOCATION: SHOULDER

## 2017-05-30 ASSESSMENT — PAIN DESCRIPTION - ORIENTATION: ORIENTATION: RIGHT

## 2017-05-31 ENCOUNTER — HOSPITAL ENCOUNTER (OUTPATIENT)
Dept: PHYSICAL THERAPY | Age: 67
Setting detail: THERAPIES SERIES
Discharge: HOME | End: 2017-05-31
Payer: MEDICARE

## 2017-05-31 PROCEDURE — 97110 THERAPEUTIC EXERCISES: CPT

## 2017-05-31 PROCEDURE — 97140 MANUAL THERAPY 1/> REGIONS: CPT

## 2017-06-02 ENCOUNTER — HOSPITAL ENCOUNTER (OUTPATIENT)
Dept: PHYSICAL THERAPY | Age: 67
Setting detail: THERAPIES SERIES
Discharge: HOME | End: 2017-06-02
Payer: MEDICARE

## 2017-06-02 PROCEDURE — 97110 THERAPEUTIC EXERCISES: CPT

## 2017-06-02 PROCEDURE — 97140 MANUAL THERAPY 1/> REGIONS: CPT

## 2017-07-17 ENCOUNTER — OFFICE VISIT (OUTPATIENT)
Dept: PRIMARY CARE CLINIC | Age: 67
End: 2017-07-17
Payer: MEDICARE

## 2017-07-17 VITALS
TEMPERATURE: 97.4 F | BODY MASS INDEX: 44.93 KG/M2 | SYSTOLIC BLOOD PRESSURE: 122 MMHG | OXYGEN SATURATION: 99 % | HEART RATE: 87 BPM | WEIGHT: 278.4 LBS | RESPIRATION RATE: 20 BRPM | DIASTOLIC BLOOD PRESSURE: 68 MMHG

## 2017-07-17 DIAGNOSIS — J20.9 ACUTE BRONCHITIS WITH ASTHMA: Primary | ICD-10-CM

## 2017-07-17 DIAGNOSIS — J45.909 ACUTE BRONCHITIS WITH ASTHMA: Primary | ICD-10-CM

## 2017-07-17 PROCEDURE — 1123F ACP DISCUSS/DSCN MKR DOCD: CPT | Performed by: NURSE PRACTITIONER

## 2017-07-17 PROCEDURE — 1036F TOBACCO NON-USER: CPT | Performed by: NURSE PRACTITIONER

## 2017-07-17 PROCEDURE — G8417 CALC BMI ABV UP PARAM F/U: HCPCS | Performed by: NURSE PRACTITIONER

## 2017-07-17 PROCEDURE — 3017F COLORECTAL CA SCREEN DOC REV: CPT | Performed by: NURSE PRACTITIONER

## 2017-07-17 PROCEDURE — 1090F PRES/ABSN URINE INCON ASSESS: CPT | Performed by: NURSE PRACTITIONER

## 2017-07-17 PROCEDURE — G8598 ASA/ANTIPLAT THER USED: HCPCS | Performed by: NURSE PRACTITIONER

## 2017-07-17 PROCEDURE — G8399 PT W/DXA RESULTS DOCUMENT: HCPCS | Performed by: NURSE PRACTITIONER

## 2017-07-17 PROCEDURE — 4040F PNEUMOC VAC/ADMIN/RCVD: CPT | Performed by: NURSE PRACTITIONER

## 2017-07-17 PROCEDURE — G8427 DOCREV CUR MEDS BY ELIG CLIN: HCPCS | Performed by: NURSE PRACTITIONER

## 2017-07-17 PROCEDURE — 3014F SCREEN MAMMO DOC REV: CPT | Performed by: NURSE PRACTITIONER

## 2017-07-17 PROCEDURE — 99213 OFFICE O/P EST LOW 20 MIN: CPT | Performed by: NURSE PRACTITIONER

## 2017-07-17 RX ORDER — AMOXICILLIN AND CLAVULANATE POTASSIUM 875; 125 MG/1; MG/1
1 TABLET, FILM COATED ORAL 2 TIMES DAILY
Qty: 20 TABLET | Refills: 0 | Status: SHIPPED | OUTPATIENT
Start: 2017-07-17 | End: 2017-07-27

## 2017-07-17 RX ORDER — BENZONATATE 200 MG/1
200 CAPSULE ORAL 3 TIMES DAILY PRN
Qty: 20 CAPSULE | Refills: 0 | Status: SHIPPED | OUTPATIENT
Start: 2017-07-17 | End: 2017-07-24

## 2017-07-17 RX ORDER — MELOXICAM 7.5 MG/1
7.5 TABLET ORAL DAILY
Qty: 90 TABLET | Refills: 1 | Status: SHIPPED | OUTPATIENT
Start: 2017-07-17 | End: 2018-02-01 | Stop reason: SDUPTHER

## 2017-07-17 ASSESSMENT — ENCOUNTER SYMPTOMS
WHEEZING: 0
DIARRHEA: 0
SORE THROAT: 0
COUGH: 1
VOMITING: 0
HEARTBURN: 0
RHINORRHEA: 0
SHORTNESS OF BREATH: 0
NAUSEA: 0

## 2017-08-04 ENCOUNTER — HOSPITAL ENCOUNTER (OUTPATIENT)
Age: 67
Discharge: HOME | End: 2017-08-04
Payer: MEDICARE

## 2017-08-04 LAB
-: ABNORMAL
AMORPHOUS: ABNORMAL
BACTERIA: ABNORMAL
BILIRUBIN URINE: NEGATIVE
CASTS UA: ABNORMAL /LPF
COLOR: YELLOW
COMMENT UA: ABNORMAL
CREATININE URINE: 187.7 MG/DL (ref 28–217)
CRYSTALS, UA: ABNORMAL /HPF
EPITHELIAL CELLS UA: ABNORMAL /HPF (ref 0–25)
GLUCOSE URINE: NEGATIVE
HCT VFR BLD CALC: 42.2 % (ref 36–46)
HEMOGLOBIN: 13.9 G/DL (ref 12–16)
KETONES, URINE: NEGATIVE
LEUKOCYTE ESTERASE, URINE: ABNORMAL
MAGNESIUM: 2 MG/DL (ref 1.6–2.6)
MCH RBC QN AUTO: 26.5 PG (ref 26–34)
MCHC RBC AUTO-ENTMCNC: 33.1 G/DL (ref 31–37)
MCV RBC AUTO: 80.2 FL (ref 80–100)
MUCUS: ABNORMAL
NITRITE, URINE: POSITIVE
OTHER OBSERVATIONS UA: ABNORMAL
PDW BLD-RTO: 14.6 % (ref 12.1–15.2)
PH UA: 6 (ref 5–9)
PLATELET # BLD: 258 K/UL (ref 140–450)
PMV BLD AUTO: 9 FL (ref 6–12)
PROTEIN UA: ABNORMAL
PTH INTACT: 124.3 PG/ML (ref 15–65)
RBC # BLD: 5.26 M/UL (ref 4–5.2)
RBC UA: ABNORMAL /HPF (ref 0–2)
RENAL EPITHELIAL, UA: ABNORMAL /HPF
SPECIFIC GRAVITY UA: 1.02 (ref 1.01–1.02)
TOTAL PROTEIN, URINE: 35 MG/DL
TRICHOMONAS: ABNORMAL
TURBIDITY: ABNORMAL
URIC ACID: 8.4 MG/DL (ref 2.4–5.7)
URINE HGB: ABNORMAL
UROBILINOGEN, URINE: NORMAL
WBC # BLD: 10.6 K/UL (ref 3.5–11)
WBC UA: ABNORMAL /HPF (ref 0–5)
YEAST: ABNORMAL

## 2017-08-04 PROCEDURE — 80069 RENAL FUNCTION PANEL: CPT

## 2017-08-04 PROCEDURE — 85027 COMPLETE CBC AUTOMATED: CPT

## 2017-08-04 PROCEDURE — 83735 ASSAY OF MAGNESIUM: CPT

## 2017-08-04 PROCEDURE — 82570 ASSAY OF URINE CREATININE: CPT

## 2017-08-04 PROCEDURE — 83970 ASSAY OF PARATHORMONE: CPT

## 2017-08-04 PROCEDURE — 81001 URINALYSIS AUTO W/SCOPE: CPT

## 2017-08-04 PROCEDURE — 84550 ASSAY OF BLOOD/URIC ACID: CPT

## 2017-08-04 PROCEDURE — 84156 ASSAY OF PROTEIN URINE: CPT

## 2017-08-04 PROCEDURE — 36415 COLL VENOUS BLD VENIPUNCTURE: CPT

## 2017-08-08 LAB
ALBUMIN SERPL-MCNC: 3.8 G/DL (ref 3.5–5.2)
ANION GAP SERPL CALCULATED.3IONS-SCNC: 17 MMOL/L (ref 9–17)
BUN BLDV-MCNC: 16 MG/DL (ref 8–23)
BUN/CREAT BLD: 13 (ref 9–20)
CALCIUM SERPL-MCNC: 9.3 MG/DL (ref 8.6–10.4)
CHLORIDE BLD-SCNC: 102 MMOL/L (ref 98–107)
CO2: 26 MMOL/L (ref 20–31)
CREAT SERPL-MCNC: 1.26 MG/DL (ref 0.5–0.9)
GFR AFRICAN AMERICAN: 51 ML/MIN
GFR NON-AFRICAN AMERICAN: 42 ML/MIN
GFR SERPL CREATININE-BSD FRML MDRD: ABNORMAL ML/MIN/{1.73_M2}
GFR SERPL CREATININE-BSD FRML MDRD: ABNORMAL ML/MIN/{1.73_M2}
GLUCOSE BLD-MCNC: 109 MG/DL (ref 70–99)
PHOSPHORUS: 2.1 MG/DL (ref 2.6–4.5)
POTASSIUM SERPL-SCNC: 3.6 MMOL/L (ref 3.7–5.3)
SODIUM BLD-SCNC: 145 MMOL/L (ref 135–144)

## 2017-08-09 ENCOUNTER — HOSPITAL ENCOUNTER (OUTPATIENT)
Dept: LAB | Age: 67
Discharge: HOME | End: 2017-08-09
Attending: INTERNAL MEDICINE
Payer: MEDICARE

## 2017-08-09 ENCOUNTER — HOSPITAL ENCOUNTER (OUTPATIENT)
Dept: CT IMAGING | Age: 67
Discharge: HOME | End: 2017-08-09
Attending: INTERNAL MEDICINE | Admitting: INTERNAL MEDICINE
Payer: MEDICARE

## 2017-08-09 DIAGNOSIS — N13.5 URETERAL STRICTURE, RIGHT: ICD-10-CM

## 2017-08-09 DIAGNOSIS — C20 RECTAL CANCER (HCC): ICD-10-CM

## 2017-08-09 DIAGNOSIS — R97.0 ELEVATED CEA: ICD-10-CM

## 2017-08-09 DIAGNOSIS — Z85.048 HISTORY OF RECTAL CANCER: ICD-10-CM

## 2017-08-09 LAB
ABSOLUTE EOS #: 0.23 K/UL (ref 0–0.4)
ABSOLUTE LYMPH #: 1.54 K/UL (ref 1–4.8)
ABSOLUTE MONO #: 0.46 K/UL (ref 0–1)
ALBUMIN SERPL-MCNC: 3.8 G/DL (ref 3.5–5.2)
ALBUMIN/GLOBULIN RATIO: 1.2 (ref 1–2.5)
ALP BLD-CCNC: 93 U/L (ref 35–104)
ALT SERPL-CCNC: 17 U/L (ref 5–33)
ANION GAP SERPL CALCULATED.3IONS-SCNC: 12 MMOL/L (ref 9–17)
AST SERPL-CCNC: 18 U/L
BASOPHILS # BLD: 1 %
BASOPHILS ABSOLUTE: 0.08 K/UL (ref 0–0.2)
BILIRUB SERPL-MCNC: 0.4 MG/DL (ref 0.3–1.2)
BUN BLDV-MCNC: 16 MG/DL (ref 8–23)
BUN/CREAT BLD: 14 (ref 9–20)
CALCIUM SERPL-MCNC: 9.4 MG/DL (ref 8.6–10.4)
CARCINOEMBRYONIC ANTIGEN: 26.1 NG/ML
CHLORIDE BLD-SCNC: 103 MMOL/L (ref 98–107)
CO2: 27 MMOL/L (ref 20–31)
CREAT SERPL-MCNC: 1.11 MG/DL (ref 0.5–0.9)
DIFFERENTIAL TYPE: ABNORMAL
EOSINOPHILS RELATIVE PERCENT: 3 %
GFR AFRICAN AMERICAN: 59 ML/MIN
GFR NON-AFRICAN AMERICAN: 49 ML/MIN
GFR SERPL CREATININE-BSD FRML MDRD: ABNORMAL ML/MIN/{1.73_M2}
GFR SERPL CREATININE-BSD FRML MDRD: ABNORMAL ML/MIN/{1.73_M2}
GLUCOSE BLD-MCNC: 119 MG/DL (ref 70–99)
HCT VFR BLD CALC: 41.8 % (ref 36–46)
HEMOGLOBIN: 13.4 G/DL (ref 12–16)
LYMPHOCYTES # BLD: 20 %
MCH RBC QN AUTO: 25.4 PG (ref 26–34)
MCHC RBC AUTO-ENTMCNC: 32 G/DL (ref 31–37)
MCV RBC AUTO: 79.4 FL (ref 80–100)
MONOCYTES # BLD: 6 %
MORPHOLOGY: NORMAL
PDW BLD-RTO: 13.5 % (ref 12.1–15.2)
PLATELET # BLD: 244 K/UL (ref 140–450)
PLATELET ESTIMATE: ABNORMAL
PMV BLD AUTO: 8.9 FL (ref 6–12)
POTASSIUM SERPL-SCNC: 3.2 MMOL/L (ref 3.7–5.3)
RBC # BLD: 5.27 M/UL (ref 4–5.2)
RBC # BLD: ABNORMAL 10*6/UL
SEG NEUTROPHILS: 70 %
SEGMENTED NEUTROPHILS ABSOLUTE COUNT: 5.39 K/UL (ref 1.8–7.7)
SODIUM BLD-SCNC: 142 MMOL/L (ref 135–144)
TOTAL PROTEIN: 6.9 G/DL (ref 6.4–8.3)
WBC # BLD: 7.7 K/UL (ref 3.5–11)
WBC # BLD: ABNORMAL 10*3/UL

## 2017-08-09 PROCEDURE — 71260 CT THORAX DX C+: CPT

## 2017-08-09 PROCEDURE — 36415 COLL VENOUS BLD VENIPUNCTURE: CPT

## 2017-08-09 PROCEDURE — 6360000004 HC RX CONTRAST MEDICATION: Performed by: INTERNAL MEDICINE

## 2017-08-09 PROCEDURE — 74177 CT ABD & PELVIS W/CONTRAST: CPT

## 2017-08-09 PROCEDURE — 80053 COMPREHEN METABOLIC PANEL: CPT

## 2017-08-09 PROCEDURE — 85025 COMPLETE CBC W/AUTO DIFF WBC: CPT

## 2017-08-09 PROCEDURE — 82378 CARCINOEMBRYONIC ANTIGEN: CPT

## 2017-08-09 RX ADMIN — IOPAMIDOL 50 ML: 612 INJECTION, SOLUTION INTRAVENOUS at 11:27

## 2017-08-21 ENCOUNTER — OFFICE VISIT (OUTPATIENT)
Dept: PULMONOLOGY | Age: 67
End: 2017-08-21
Payer: MEDICARE

## 2017-08-21 VITALS
TEMPERATURE: 97.5 F | DIASTOLIC BLOOD PRESSURE: 83 MMHG | HEART RATE: 74 BPM | SYSTOLIC BLOOD PRESSURE: 159 MMHG | BODY MASS INDEX: 44.68 KG/M2 | OXYGEN SATURATION: 96 % | HEIGHT: 66 IN | RESPIRATION RATE: 16 BRPM | WEIGHT: 278 LBS

## 2017-08-21 DIAGNOSIS — I10 ESSENTIAL HYPERTENSION: ICD-10-CM

## 2017-08-21 DIAGNOSIS — G47.33 OSA (OBSTRUCTIVE SLEEP APNEA): Primary | ICD-10-CM

## 2017-08-21 DIAGNOSIS — E66.01 MORBID (SEVERE) OBESITY DUE TO EXCESS CALORIES (HCC): ICD-10-CM

## 2017-08-21 DIAGNOSIS — J45.30 MILD PERSISTENT ASTHMA WITHOUT COMPLICATION: ICD-10-CM

## 2017-08-21 PROCEDURE — 3017F COLORECTAL CA SCREEN DOC REV: CPT | Performed by: INTERNAL MEDICINE

## 2017-08-21 PROCEDURE — 3014F SCREEN MAMMO DOC REV: CPT | Performed by: INTERNAL MEDICINE

## 2017-08-21 PROCEDURE — G8399 PT W/DXA RESULTS DOCUMENT: HCPCS | Performed by: INTERNAL MEDICINE

## 2017-08-21 PROCEDURE — G8598 ASA/ANTIPLAT THER USED: HCPCS | Performed by: INTERNAL MEDICINE

## 2017-08-21 PROCEDURE — G8417 CALC BMI ABV UP PARAM F/U: HCPCS | Performed by: INTERNAL MEDICINE

## 2017-08-21 PROCEDURE — 1090F PRES/ABSN URINE INCON ASSESS: CPT | Performed by: INTERNAL MEDICINE

## 2017-08-21 PROCEDURE — 1036F TOBACCO NON-USER: CPT | Performed by: INTERNAL MEDICINE

## 2017-08-21 PROCEDURE — G8427 DOCREV CUR MEDS BY ELIG CLIN: HCPCS | Performed by: INTERNAL MEDICINE

## 2017-08-21 PROCEDURE — 4040F PNEUMOC VAC/ADMIN/RCVD: CPT | Performed by: INTERNAL MEDICINE

## 2017-08-21 PROCEDURE — 99215 OFFICE O/P EST HI 40 MIN: CPT | Performed by: INTERNAL MEDICINE

## 2017-08-21 PROCEDURE — 1123F ACP DISCUSS/DSCN MKR DOCD: CPT | Performed by: INTERNAL MEDICINE

## 2017-09-06 ENCOUNTER — OFFICE VISIT (OUTPATIENT)
Dept: ONCOLOGY | Age: 67
End: 2017-09-06
Payer: MEDICARE

## 2017-09-06 VITALS
BODY MASS INDEX: 44.26 KG/M2 | TEMPERATURE: 96.7 F | HEIGHT: 66 IN | RESPIRATION RATE: 16 BRPM | DIASTOLIC BLOOD PRESSURE: 98 MMHG | HEART RATE: 83 BPM | SYSTOLIC BLOOD PRESSURE: 135 MMHG | WEIGHT: 275.4 LBS

## 2017-09-06 DIAGNOSIS — C20 RECTAL CANCER (HCC): Primary | ICD-10-CM

## 2017-09-06 DIAGNOSIS — Z85.048 HISTORY OF RECTAL CANCER: ICD-10-CM

## 2017-09-06 PROCEDURE — G8417 CALC BMI ABV UP PARAM F/U: HCPCS | Performed by: INTERNAL MEDICINE

## 2017-09-06 PROCEDURE — 3017F COLORECTAL CA SCREEN DOC REV: CPT | Performed by: INTERNAL MEDICINE

## 2017-09-06 PROCEDURE — 1123F ACP DISCUSS/DSCN MKR DOCD: CPT | Performed by: INTERNAL MEDICINE

## 2017-09-06 PROCEDURE — 1090F PRES/ABSN URINE INCON ASSESS: CPT | Performed by: INTERNAL MEDICINE

## 2017-09-06 PROCEDURE — G8427 DOCREV CUR MEDS BY ELIG CLIN: HCPCS | Performed by: INTERNAL MEDICINE

## 2017-09-06 PROCEDURE — 4040F PNEUMOC VAC/ADMIN/RCVD: CPT | Performed by: INTERNAL MEDICINE

## 2017-09-06 PROCEDURE — 1036F TOBACCO NON-USER: CPT | Performed by: INTERNAL MEDICINE

## 2017-09-06 PROCEDURE — G8598 ASA/ANTIPLAT THER USED: HCPCS | Performed by: INTERNAL MEDICINE

## 2017-09-06 PROCEDURE — 99214 OFFICE O/P EST MOD 30 MIN: CPT | Performed by: INTERNAL MEDICINE

## 2017-09-06 PROCEDURE — 3014F SCREEN MAMMO DOC REV: CPT | Performed by: INTERNAL MEDICINE

## 2017-09-06 PROCEDURE — G8399 PT W/DXA RESULTS DOCUMENT: HCPCS | Performed by: INTERNAL MEDICINE

## 2017-10-04 ENCOUNTER — HOSPITAL ENCOUNTER (OUTPATIENT)
Age: 67
Setting detail: SPECIMEN
Discharge: HOME | End: 2017-10-04
Payer: MEDICARE

## 2017-10-04 ENCOUNTER — OFFICE VISIT (OUTPATIENT)
Dept: OBGYN | Age: 67
End: 2017-10-04
Payer: MEDICARE

## 2017-10-04 VITALS
BODY MASS INDEX: 43.71 KG/M2 | SYSTOLIC BLOOD PRESSURE: 130 MMHG | DIASTOLIC BLOOD PRESSURE: 82 MMHG | WEIGHT: 272 LBS | HEIGHT: 66 IN

## 2017-10-04 DIAGNOSIS — Z12.31 ENCOUNTER FOR SCREENING MAMMOGRAM FOR BREAST CANCER: ICD-10-CM

## 2017-10-04 DIAGNOSIS — Z11.51 SCREENING FOR HPV (HUMAN PAPILLOMAVIRUS): ICD-10-CM

## 2017-10-04 DIAGNOSIS — Z90.49 H/O RESECTION OF RECTUM: ICD-10-CM

## 2017-10-04 DIAGNOSIS — C20 RECTAL CANCER (HCC): ICD-10-CM

## 2017-10-04 DIAGNOSIS — Z01.419 ROUTINE GYNECOLOGICAL EXAMINATION: ICD-10-CM

## 2017-10-04 DIAGNOSIS — Z01.419 ROUTINE GYNECOLOGICAL EXAMINATION: Primary | ICD-10-CM

## 2017-10-04 PROCEDURE — 87624 HPV HI-RISK TYP POOLED RSLT: CPT

## 2017-10-04 PROCEDURE — G0101 CA SCREEN;PELVIC/BREAST EXAM: HCPCS | Performed by: ADVANCED PRACTICE MIDWIFE

## 2017-10-04 PROCEDURE — 1036F TOBACCO NON-USER: CPT | Performed by: ADVANCED PRACTICE MIDWIFE

## 2017-10-04 PROCEDURE — G8427 DOCREV CUR MEDS BY ELIG CLIN: HCPCS | Performed by: ADVANCED PRACTICE MIDWIFE

## 2017-10-04 PROCEDURE — G8417 CALC BMI ABV UP PARAM F/U: HCPCS | Performed by: ADVANCED PRACTICE MIDWIFE

## 2017-10-04 PROCEDURE — G0145 SCR C/V CYTO,THINLAYER,RESCR: HCPCS

## 2017-10-04 ASSESSMENT — ENCOUNTER SYMPTOMS
EYES NEGATIVE: 1
RESPIRATORY NEGATIVE: 1

## 2017-10-04 NOTE — PROGRESS NOTES
Constitutional: Negative. HENT: Negative. Eyes: Negative. Respiratory: Negative. Cardiovascular: Negative. Musculoskeletal: Negative. Skin: Negative. Neurological: Positive for dizziness. Psychiatric/Behavioral: Negative. Physical Exam    Objective  Lymphatic:   no lymphadenopathy  Heent:   negative   Cor: regular rate and rhythm, no murmurs              Pul:clear to auscultation bilaterally- no wheezes, rales or rhonchi, normal air movement, no respiratory distress      GI: Abdomen soft, non-tender. BS normal. No masses,  No organomegaly, colostomy abg resent in the left mid Quadrant. Draining well. Extremities: normal strength, tone, and muscle mass, large amount of broken blisters on lower extremities (since she has gottent his since her chemo and radiation treatments- is headed to dermatology)   Breasts: Breast:normal appearance, no masses or tenderness   Pelvic Exam: GENITAL/URINARY:  External Genitalia:  General appearance; normal, Hair distribution; normal, Lesions absent  Urethral Meatus:  Size normal, Location normal, Lesions absent, Prolapse absent  Urethra: Fullness absent, Masses absent  Bladder:  Fullness absent, Masses absent, Tenderness absent, Cystocele absent  Vagina:  General appearance normal, Estrogen effect normal, Discharge absent, Lesions absent, Pelvic support normal  Uterus:  Size normal, Contour normal, Position normal  Anus/Perineum:  Lesions absent and Masses absent                                    Vaginal discharge: no vaginal discharge        She was also counseled on her preventative health maintenance recommendations and follow-up. Assessment and Plan         1. Routine gynecological examination  PAP SMEAR    MT OBTAINING SCREEN PAP SMEAR    MT CA SCREEN;PELVIC/BREAST EXAM   2.  Encounter for screening mammogram for breast cancer  LITA DIGITAL SCREEN W CAD BILATERAL   3. Screening for HPV (human papillomavirus)  PAP SMEAR

## 2017-10-04 NOTE — MR AVS SNAPSHOT
10/9/2018 10:10 AM Valentino Lye, [address] [facility] [place] [phone]    Future Orders Complete By Expires    LITA DIGITAL SCREEN W CAD BILATERAL [ Custom]  10/4/2017 10/4/2018    Scheduling Instructions:    Please call [phone] [facility] scheduling dept at [phone] to schedule your mammogram    Comments: And u/s of breast following mammogram if recommended by radiologist    PAP SMEAR [LAB4 Custom]  10/4/2017 (Approximate) 10/5/2018    Comments:    Patient History:    No LMP recorded. Patient has had a hysterectomy. OBGYN Status: Hysterectomy  Past Surgical History:  No date: ABDOMEN SURGERY  No date: CARPAL TUNNEL RELEASE      Comment: Right   1992: CHOLECYSTECTOMY  2012: COLECTOMY      Comment: colovaginal fistula  No date: COLONOSCOPY      Comment: several  08-: COLONOSCOPY  2/22/2016: COLONOSCOPY      Comment: -ashish  No date: CYSTOSCOPY      Comment: attempted rt. ureteral stent removal  12/11/13: CYSTOSCOPY      Comment: stent exchange  No date: CYSTOURETHROSCOPY Right      Comment: w/ stent placemement  03/25/2013: CYSTOURETHROSCOPY/STENT REMOVAL Right      Comment: new stent inserted  5/20/14: CYSTOURETHROSCOPY/STENT REMOVAL Right  No date: DILATION AND CURETTAGE OF UTERUS  2012: HYSTERECTOMY  2012: KIDNEY SURGERY Right      Comment: shunt   No date: KNEE SURGERY      Comment: Right and Left  08-: OTHER SURGICAL HISTORY      Comment: Port removal   No date: SHOULDER SURGERY      Comment: Left  No date: TUBAL LIGATION  2013: TUNNELED VENOUS PORT PLACEMENT      Smoking status: Former Smoker                                                              Packs/day: 0.50      Years: 5.00         Types: Cigarettes     Quit date: 8/24/1992  Smokeless status: Never Used                          Follow-Up    Return in about 1 year (around 10/4/2018) for yearly.          Information from Your Visit        Department     Name Address Phone Fax Pneumococcal 13-valent Conjugate ([id number]) 2/2/2017      Preventive Care        Date Due    Yearly Flu Vaccine (1) 10/18/2017 (Originally 9/1/2017)    Diabetes Screening 10/18/2017 (Originally 10/13/2015)    Tetanus Combination Vaccine (1 - Tdap) 2/2/2018 (Originally 2/18/1969)    Pneumococcal Vaccines (two) for all adults aged 72 and over (2 of 2 - PPSV23) 2/2/2018    Mammograms are recommended every 2 years for low/average risk patients aged 48 - 69, and every year for high risk patients per updated national guidelines. However these guidelines can be individualized by your provider. 11/23/2018    Colonoscopy 2/22/2021    Cholesterol Screening 4/12/2022            Hitahart Signup           Kera[employer]ST allows you to send messages to your doctor, view your test results, renew your prescriptions, schedule appointments, view visit notes, and more. How Do I Sign Up? 1. In your Internet browser, go to [email or web address]  2. Click on the Sign Up Now link in the Sign In box. You will see the New Member Sign Up page. 3. Enter your KeraFAST Access Code exactly as it appears below. You will not need to use this code after youve completed the sign-up process. If you do not sign up before the expiration date, you must request a new code. KeraFAST Access Code: [email or web address]  Expires: 12/3/2017 10:35 AM    4. Enter your Social Security Number ([id number]) and Date of Birth (mm/dd/yyyy) as indicated and click Submit. You will be taken to the next sign-up page. 5. Create a KeraFAST ID. This will be your KeraFAST login ID and cannot be changed, so think of one that is secure and easy to remember. 6. Create a KeraFAST password. You can change your password at any time. 7. Enter your Password Reset Question and Answer. This can be used at a later time if you forget your password. 8. Enter your e-mail address. You will receive e-mail notification when new information is available in [address] 9. Click Sign Up. You can now view your medical record. Additional Information  If you have questions, please contact the physician practice where you receive care. Remember, [employer] is NOT to be used for urgent needs. For medical emergencies, dial 911. For questions regarding your [employer] account call [phone]. If you have a clinical question, please call your doctor's office.

## 2017-10-05 LAB
HPV SAMPLE: NORMAL
HPV SOURCE: NORMAL
HPV, GENOTYPE 16: NOT DETECTED
HPV, GENOTYPE 18: NOT DETECTED
HPV, HIGH RISK OTHER: NOT DETECTED
HPV, INTERPRETATION: NORMAL

## 2017-10-05 RX ORDER — OSTOMY SUPPLY 2 1/4"
EACH MISCELLANEOUS
Qty: 20 EACH | Refills: 0 | Status: SHIPPED | OUTPATIENT
Start: 2017-10-05 | End: 2017-11-16 | Stop reason: SDUPTHER

## 2017-10-05 RX ORDER — OSTOMY IRRIGATOR 1 3/4"
EACH MISCELLANEOUS
Qty: 20 BOTTLE | Refills: 0 | Status: SHIPPED | OUTPATIENT
Start: 2017-10-05 | End: 2017-11-16 | Stop reason: SDUPTHER

## 2017-10-05 NOTE — TELEPHONE ENCOUNTER
AM MD Jimmy Alcocer [facility]   8/20/2018 11:00 AM [facility]   10/9/2018 10:10 AM Mikel Gunderson CNM [facility]            Patient Active Problem List:     IBS (irritable bowel syndrome)     Anxiety     Rectal cancer ([facility] [address]     Ureteral stricture, right     H/O resection of rectum     CAD (coronary artery disease)     Asthma     CKD (chronic kidney disease) stage 3, GFR 30-59 ml/min     LUI (obstructive sleep apnea)     Malignant neoplasm of large intestine (HCC)     Depression     Hydroureteronephrosis     Adiposity     History of decompression of median nerve     Morbid (severe) obesity due to excess calories ([address]     History of rectal cancer     Obesity due to excess calories     Mild intermittent asthma without complication     Essential hypertension     Mixed hyperlipidemia     Elevated CEA

## 2017-10-11 LAB — CYTOLOGY REPORT: NORMAL

## 2017-10-31 ENCOUNTER — OFFICE VISIT (OUTPATIENT)
Dept: PRIMARY CARE CLINIC | Age: 67
End: 2017-10-31
Payer: MEDICARE

## 2017-10-31 VITALS
TEMPERATURE: 97.5 F | RESPIRATION RATE: 20 BRPM | SYSTOLIC BLOOD PRESSURE: 125 MMHG | WEIGHT: 283.1 LBS | BODY MASS INDEX: 45.5 KG/M2 | DIASTOLIC BLOOD PRESSURE: 63 MMHG | HEART RATE: 76 BPM | HEIGHT: 66 IN

## 2017-10-31 DIAGNOSIS — I10 ESSENTIAL HYPERTENSION: Primary | ICD-10-CM

## 2017-10-31 DIAGNOSIS — M25.561 CHRONIC PAIN OF BOTH KNEES: ICD-10-CM

## 2017-10-31 DIAGNOSIS — Z23 NEED FOR INFLUENZA VACCINATION: ICD-10-CM

## 2017-10-31 DIAGNOSIS — M25.562 CHRONIC PAIN OF BOTH KNEES: ICD-10-CM

## 2017-10-31 DIAGNOSIS — G89.29 CHRONIC PAIN OF BOTH KNEES: ICD-10-CM

## 2017-10-31 DIAGNOSIS — E78.2 MIXED HYPERLIPIDEMIA: ICD-10-CM

## 2017-10-31 DIAGNOSIS — L20.9 ATOPIC DERMATITIS, UNSPECIFIED TYPE: ICD-10-CM

## 2017-10-31 PROCEDURE — 4040F PNEUMOC VAC/ADMIN/RCVD: CPT | Performed by: NURSE PRACTITIONER

## 2017-10-31 PROCEDURE — G0008 ADMIN INFLUENZA VIRUS VAC: HCPCS | Performed by: NURSE PRACTITIONER

## 2017-10-31 PROCEDURE — G8598 ASA/ANTIPLAT THER USED: HCPCS | Performed by: NURSE PRACTITIONER

## 2017-10-31 PROCEDURE — 3014F SCREEN MAMMO DOC REV: CPT | Performed by: NURSE PRACTITIONER

## 2017-10-31 PROCEDURE — 1036F TOBACCO NON-USER: CPT | Performed by: NURSE PRACTITIONER

## 2017-10-31 PROCEDURE — G8417 CALC BMI ABV UP PARAM F/U: HCPCS | Performed by: NURSE PRACTITIONER

## 2017-10-31 PROCEDURE — 1123F ACP DISCUSS/DSCN MKR DOCD: CPT | Performed by: NURSE PRACTITIONER

## 2017-10-31 PROCEDURE — G8427 DOCREV CUR MEDS BY ELIG CLIN: HCPCS | Performed by: NURSE PRACTITIONER

## 2017-10-31 PROCEDURE — 3017F COLORECTAL CA SCREEN DOC REV: CPT | Performed by: NURSE PRACTITIONER

## 2017-10-31 PROCEDURE — 99214 OFFICE O/P EST MOD 30 MIN: CPT | Performed by: NURSE PRACTITIONER

## 2017-10-31 PROCEDURE — G8484 FLU IMMUNIZE NO ADMIN: HCPCS | Performed by: NURSE PRACTITIONER

## 2017-10-31 PROCEDURE — 90686 IIV4 VACC NO PRSV 0.5 ML IM: CPT | Performed by: NURSE PRACTITIONER

## 2017-10-31 PROCEDURE — G8399 PT W/DXA RESULTS DOCUMENT: HCPCS | Performed by: NURSE PRACTITIONER

## 2017-10-31 PROCEDURE — 1090F PRES/ABSN URINE INCON ASSESS: CPT | Performed by: NURSE PRACTITIONER

## 2017-10-31 RX ORDER — TRAMADOL HYDROCHLORIDE 50 MG/1
50 TABLET ORAL 2 TIMES DAILY PRN
Qty: 60 TABLET | Refills: 0 | Status: SHIPPED | OUTPATIENT
Start: 2017-10-31 | End: 2018-04-20 | Stop reason: ALTCHOICE

## 2017-10-31 RX ORDER — ATORVASTATIN CALCIUM 40 MG/1
40 TABLET, FILM COATED ORAL DAILY
Qty: 90 TABLET | Refills: 3 | Status: SHIPPED | OUTPATIENT
Start: 2017-10-31 | End: 2018-10-25 | Stop reason: SDUPTHER

## 2017-10-31 RX ORDER — ALBUTEROL SULFATE 90 UG/1
2 AEROSOL, METERED RESPIRATORY (INHALATION) EVERY 6 HOURS PRN
Qty: 1 INHALER | Refills: 3 | Status: SHIPPED | OUTPATIENT
Start: 2017-10-31 | End: 2018-05-03 | Stop reason: CLARIF

## 2017-10-31 ASSESSMENT — ENCOUNTER SYMPTOMS
NAUSEA: 0
DIARRHEA: 0
CONSTIPATION: 0
SHORTNESS OF BREATH: 0
RHINORRHEA: 0
SORE THROAT: 0
WHEEZING: 0
ABDOMINAL PAIN: 0
VOMITING: 0
COUGH: 0

## 2017-10-31 ASSESSMENT — PATIENT HEALTH QUESTIONNAIRE - PHQ9
2. FEELING DOWN, DEPRESSED OR HOPELESS: 1
SUM OF ALL RESPONSES TO PHQ QUESTIONS 1-9: 2
1. LITTLE INTEREST OR PLEASURE IN DOING THINGS: 1
SUM OF ALL RESPONSES TO PHQ9 QUESTIONS 1 & 2: 2

## 2017-10-31 NOTE — PROGRESS NOTES
Name: Collins Shaffer  : 1950         Chief Complaint:     Chief Complaint   Patient presents with    Hyperlipidemia     Routin eoffic evisit.  Hypertension    Knee Pain     Bilat knees. States the \"mobic does not seem to help. \"     Blister     Bilat  legs for the past \"couple months. \" C/o itching. History of Present Illness:      Collins Shaffer is a 75 y.o.  female who presents with Hyperlipidemia (Routin eoffic evisit. ); Hypertension; Knee Pain (Bilat knees. States the \"mobic does not seem to help. \" ); and Blister (Bilat  legs for the past \"couple months. \" C/o itching. )      Kiesha Jaynadarien is here today for a routine office visit. Hyperlipidemia   This is a chronic problem. The current episode started more than 1 year ago. The problem is controlled. Recent lipid tests were reviewed and are normal. Exacerbating diseases include diabetes and obesity. She has no history of chronic renal disease. Factors aggravating her hyperlipidemia include fatty foods. Pertinent negatives include no chest pain, myalgias or shortness of breath. Current antihyperlipidemic treatment includes statins. The current treatment provides moderate improvement of lipids. Compliance problems include adherence to exercise and adherence to diet. Risk factors for coronary artery disease include dyslipidemia, hypertension, obesity, post-menopausal and a sedentary lifestyle. Hypertension   This is a chronic problem. The current episode started more than 1 year ago. The problem is unchanged. The problem is controlled. Associated symptoms include anxiety (chronic). Pertinent negatives include no chest pain, headaches, malaise/fatigue, neck pain, peripheral edema or shortness of breath. There are no associated agents to hypertension. Risk factors for coronary artery disease include sedentary lifestyle, obesity, post-menopausal state and dyslipidemia. Past treatments include angiotensin blockers and diuretics.  The current (irritable bowel syndrome)     Morbid (severe) obesity due to excess calories ([address] 12/7/2015    Multiple allergies     Obesity     Obesity due to excess calories 6/6/2016    LUI (obstructive sleep apnea) 6/8/2015    Rectal cancer ([facility] [address] 2012    SIRS due to infectious process without acute organ dysfunction ([facility] [facility]     Sleep apnea     Unspecified cerebral artery occlusion with cerebral infarction     states that she had on 10/19/12, resulted in left sided  weakness      Reviewed all health maintenance requirements and ordered appropriate tests  Health Maintenance Due   Topic Date Due    Diabetes screen  10/13/2015       Past Surgical History:     Past Surgical History:   Procedure Laterality Date    ABDOMEN SURGERY      CARPAL TUNNEL RELEASE      Right     CHOLECYSTECTOMY  1992    COLECTOMY  2012    colovaginal fistula    COLONOSCOPY      several    COLONOSCOPY  08-    COLONOSCOPY  2/22/2016    -normal    CYSTOSCOPY      attempted rt. ureteral stent removal    CYSTOSCOPY  12/11/13    stent exchange    CYSTOURETHROSCOPY Right     w/ stent placemement    CYSTOURETHROSCOPY/STENT REMOVAL Right 03/25/2013    new stent inserted    CYSTOURETHROSCOPY/STENT REMOVAL Right 5/20/14    DILATION AND CURETTAGE OF UTERUS      HYSTERECTOMY, TOTAL ABDOMINAL  2012    KIDNEY SURGERY Right 2012    shunt     KNEE SURGERY      Right and Left    OTHER SURGICAL HISTORY  08-    Port removal     SHOULDER SURGERY      Left    TUBAL LIGATION      TUNNELED VENOUS PORT PLACEMENT  2013        Medications:       Prior to Admission medications    Medication Sig Start Date End Date Taking?  Authorizing Provider   atorvastatin (LIPITOR) 40 MG tablet Take 1 tablet by mouth daily 10/31/17  Yes Ema Alejandre CNP   albuterol sulfate HFA (VENTOLIN HFA) 108 (90 Base) MCG/ACT inhaler Inhale 2 puffs into the lungs every 6 hours as needed for Wheezing 10/31/17  Yes Ema Alejandre CNP   traMADol (ULTRAM) 50 MG tobacco.  Alcohol:      reports that she does not drink alcohol. Drug Use:  reports that she does not use drugs. Family History:     Family History   Problem Relation Age of Onset    High Blood Pressure Mother     Heart Disease Mother       of CHF    Cancer Father      carcinoma of lung       Review of Systems:     Positive and Negative as described in HPI    Review of Systems   Constitutional: Negative for chills, fatigue, fever and malaise/fatigue. HENT: Negative for congestion, rhinorrhea and sore throat. Respiratory: Negative for cough, shortness of breath and wheezing. Cardiovascular: Negative for chest pain and leg swelling. Gastrointestinal: Negative for abdominal pain, constipation, diarrhea, nausea and vomiting. Genitourinary: Negative for difficulty urinating and dysuria. Musculoskeletal: Positive for arthralgias and gait problem. Negative for myalgias and neck pain. Skin: Positive for rash. Neurological: Negative for dizziness, tingling, syncope, numbness and headaches. Psychiatric/Behavioral: The patient is nervous/anxious. Physical Exam:   Vitals:  /63 (Site: Left Arm, Position: Sitting)   Pulse 76   Temp 97.5 °F (36.4 °C) (Temporal)   Resp 20   Ht 5' 6\" (1.676 m)   Wt 283 lb 1.6 oz (128.4 kg)   BMI 45.69 kg/m²     Physical Exam   Constitutional: She is oriented to person, place, and time. She appears well-developed and well-nourished. No distress. HENT:   Mouth/Throat: Oropharynx is clear and moist.   Eyes: Conjunctivae are normal. No scleral icterus. Neck: Normal range of motion. Neck supple. Cardiovascular: Normal rate and regular rhythm. No murmur heard. Pulmonary/Chest: Effort normal and breath sounds normal. She has no wheezes. She has no rales. Abdominal: Soft. Bowel sounds are normal. She exhibits no distension. There is no tenderness. Morbidly obese abdomen   Musculoskeletal: She exhibits no edema.         Right knee: She exhibits use, benefit, and side effects of prescribed medications. Barriers to medication compliance addressed. All patient questions answered. Pt voiced understanding. 5.  Reviewed prior labs and health maintenance  6. Continue current medications, diet and exercise. Completed Refills   Requested Prescriptions     Signed Prescriptions Disp Refills    atorvastatin (LIPITOR) 40 MG tablet 90 tablet 3     Sig: Take 1 tablet by mouth daily    albuterol sulfate HFA (VENTOLIN HFA) 108 (90 Base) MCG/ACT inhaler 1 Inhaler 3     Sig: Inhale 2 puffs into the lungs every 6 hours as needed for Wheezing    traMADol (ULTRAM) 50 MG tablet 60 tablet 0     Sig: Take 1 tablet by mouth 2 times daily as needed for Pain    fluticasone-salmeterol (ADVAIR HFA) 115-21 MCG/ACT inhaler 3 Inhaler 3     Sig: Inhale 1 puff into the lungs 2 times daily         Return in about 6 months (around 4/30/2018) for check up.

## 2017-10-31 NOTE — PROGRESS NOTES
After obtaining consent, and per orders of Dr. Teddy Alejandre, injection of Influenza vaccine given in Left deltoid by Mackenzie Aviles. Patient instructed to remain in clinic for 20 minutes afterwards, and to report any adverse reaction to me immediately.

## 2017-10-31 NOTE — PATIENT INSTRUCTIONS
Patient Education        Knee Pain or Injury: Care Instructions  Your Care Instructions    Injuries are a common cause of knee problems. Sudden (acute) injuries may be caused by a direct blow to the knee. They can also be caused by abnormal twisting, bending, or falling on the knee. Pain, bruising, or swelling may be severe, and may start within minutes of the injury. Overuse is another cause of knee pain. Other causes are climbing stairs, kneeling, and other activities that use the knee. Everyday wear and tear, especially as you get older, also can cause knee pain. Rest, along with home treatment, often relieves pain and allows your knee to heal. If you have a serious knee injury, you may need tests and treatment. Follow-up care is a key part of your treatment and safety. Be sure to make and go to all appointments, and call your doctor if you are having problems. It's also a good idea to know your test results and keep a list of the medicines you take. How can you care for yourself at home? · Be safe with medicines. Read and follow all instructions on the label. ¨ If the doctor gave you a prescription medicine for pain, take it as prescribed. ¨ If you are not taking a prescription pain medicine, ask your doctor if you can take an over-the-counter medicine. · Rest and protect your knee. Take a break from any activity that may cause pain. · Put ice or a cold pack on your knee for 10 to 20 minutes at a time. Put a thin cloth between the ice and your skin. · Prop up a sore knee on a pillow when you ice it or anytime you sit or lie down for the next 3 days. Try to keep it above the level of your heart. This will help reduce swelling. · If your knee is not swollen, you can put moist heat, a heating pad, or a warm cloth on your knee. · If your doctor recommends an elastic bandage, sleeve, or other type of support for your knee, wear it as directed.   · Follow your doctor's instructions about how much weight you can put on your leg. Use a cane, crutches, or a walker as instructed. · Follow your doctor's instructions about activity during your healing process. If you can do mild exercise, slowly increase your activity. · Reach and stay at a healthy weight. Extra weight can strain the joints, especially the knees and hips, and make the pain worse. Losing even a few pounds may help. When should you call for help? Call 911 anytime you think you may need emergency care. For example, call if:  · You have symptoms of a blood clot in your lung (called a pulmonary embolism). These may include:  ¨ Sudden chest pain. ¨ Trouble breathing. ¨ Coughing up blood. Call your doctor now or seek immediate medical care if:  · You have severe or increasing pain. · Your leg or foot turns cold or changes color. · You cannot stand or put weight on your knee. · Your knee looks twisted or bent out of shape. · You cannot move your knee. · You have signs of infection, such as:  ¨ Increased pain, swelling, warmth, or redness. ¨ Red streaks leading from the knee. ¨ Pus draining from a place on your knee. ¨ A fever. · You have signs of a blood clot in your leg (called a deep vein thrombosis), such as:  ¨ Pain in your calf, back of the knee, thigh, or groin. ¨ Redness and swelling in your leg or groin. Watch closely for changes in your health, and be sure to contact your doctor if:  · You have tingling, weakness, or numbness in your knee. · You have any new symptoms, such as swelling. · You have bruises from a knee injury that last longer than 2 weeks. · You do not get better as expected. Where can you learn more? Go to https://Digital Folio.[employer]. org and sign in to your InExchange account. Enter K195 in the UTOPY box to learn more about \"Knee Pain or Injury: Care Instructions. \"     If you do not have an account, please click on the \"Sign Up Now\" link.   Current as of: March 20, 2017  Content Version: 11.3  © 5158-1238 Healthwise, Incorporated. Care instructions adapted under license by [facility] [place] ([facility]). If you have questions about a medical condition or this instruction, always ask your healthcare professional. [place] [address] any warranty or liability for your use of this information. Appointment with Dermatology, Garth Gowers on 11/17/17 @ 11:00. Located at [address], [phone]. Appointment with Dr. Brittany Malloy on 11/13/17 @ 1:30 in [facility].

## 2017-11-06 ENCOUNTER — HOSPITAL ENCOUNTER (OUTPATIENT)
Age: 67
Discharge: HOME | End: 2017-11-06
Payer: MEDICARE

## 2017-11-06 LAB
-: ABNORMAL
ALBUMIN SERPL-MCNC: 3.7 G/DL (ref 3.5–5.2)
AMORPHOUS: ABNORMAL
ANION GAP SERPL CALCULATED.3IONS-SCNC: 15 MMOL/L (ref 9–17)
BACTERIA: ABNORMAL
BILIRUBIN URINE: NEGATIVE
BUN BLDV-MCNC: 17 MG/DL (ref 8–23)
BUN/CREAT BLD: 17 (ref 9–20)
CALCIUM SERPL-MCNC: 9.1 MG/DL (ref 8.6–10.4)
CASTS UA: ABNORMAL /LPF
CHLORIDE BLD-SCNC: 103 MMOL/L (ref 98–107)
CO2: 24 MMOL/L (ref 20–31)
COLOR: YELLOW
COMMENT UA: ABNORMAL
CREAT SERPL-MCNC: 0.98 MG/DL (ref 0.5–0.9)
CREATININE URINE: 162.8 MG/DL (ref 28–217)
CRYSTALS, UA: ABNORMAL /HPF
EPITHELIAL CELLS UA: ABNORMAL /HPF (ref 0–25)
GFR AFRICAN AMERICAN: >60 ML/MIN
GFR NON-AFRICAN AMERICAN: 56 ML/MIN
GFR SERPL CREATININE-BSD FRML MDRD: ABNORMAL ML/MIN/{1.73_M2}
GFR SERPL CREATININE-BSD FRML MDRD: ABNORMAL ML/MIN/{1.73_M2}
GLUCOSE BLD-MCNC: 108 MG/DL (ref 70–99)
GLUCOSE URINE: NEGATIVE
HCT VFR BLD CALC: 40.7 % (ref 36–46)
HEMOGLOBIN: 13.3 G/DL (ref 12–16)
KETONES, URINE: NEGATIVE
LEUKOCYTE ESTERASE, URINE: ABNORMAL
MAGNESIUM: 2 MG/DL (ref 1.6–2.6)
MCH RBC QN AUTO: 26 PG (ref 26–34)
MCHC RBC AUTO-ENTMCNC: 32.7 G/DL (ref 31–37)
MCV RBC AUTO: 79.6 FL (ref 80–100)
MUCUS: ABNORMAL
NITRITE, URINE: POSITIVE
OTHER OBSERVATIONS UA: ABNORMAL
PDW BLD-RTO: 15.2 % (ref 12.1–15.2)
PH UA: 6 (ref 5–9)
PHOSPHORUS: 2.5 MG/DL (ref 2.6–4.5)
PLATELET # BLD: 215 K/UL (ref 140–450)
PMV BLD AUTO: 10.4 FL (ref 6–12)
POTASSIUM SERPL-SCNC: 3.9 MMOL/L (ref 3.7–5.3)
PROTEIN UA: NEGATIVE
PTH INTACT: 93.53 PG/ML (ref 15–65)
RBC # BLD: 5.11 M/UL (ref 4–5.2)
RBC UA: ABNORMAL /HPF (ref 0–2)
RENAL EPITHELIAL, UA: ABNORMAL /HPF
SODIUM BLD-SCNC: 142 MMOL/L (ref 135–144)
SPECIFIC GRAVITY UA: 1.02 (ref 1.01–1.02)
TOTAL PROTEIN, URINE: 21 MG/DL
TRICHOMONAS: ABNORMAL
TURBIDITY: CLEAR
URIC ACID: 8.3 MG/DL (ref 2.4–5.7)
URINE HGB: ABNORMAL
UROBILINOGEN, URINE: NORMAL
WBC # BLD: 11 K/UL (ref 3.5–11)
WBC UA: ABNORMAL /HPF (ref 0–5)
YEAST: ABNORMAL

## 2017-11-06 PROCEDURE — 85027 COMPLETE CBC AUTOMATED: CPT

## 2017-11-06 PROCEDURE — 83735 ASSAY OF MAGNESIUM: CPT

## 2017-11-06 PROCEDURE — 36415 COLL VENOUS BLD VENIPUNCTURE: CPT

## 2017-11-06 PROCEDURE — 84550 ASSAY OF BLOOD/URIC ACID: CPT

## 2017-11-06 PROCEDURE — 81001 URINALYSIS AUTO W/SCOPE: CPT

## 2017-11-06 PROCEDURE — 82570 ASSAY OF URINE CREATININE: CPT

## 2017-11-06 PROCEDURE — 84156 ASSAY OF PROTEIN URINE: CPT

## 2017-11-06 PROCEDURE — 83970 ASSAY OF PARATHORMONE: CPT

## 2017-11-06 PROCEDURE — 80069 RENAL FUNCTION PANEL: CPT

## 2017-11-16 ENCOUNTER — HOSPITAL ENCOUNTER (OUTPATIENT)
Dept: WOMENS IMAGING | Age: 67
Discharge: HOME | End: 2017-11-16
Payer: MEDICARE

## 2017-11-16 DIAGNOSIS — Z12.31 ENCOUNTER FOR SCREENING MAMMOGRAM FOR BREAST CANCER: ICD-10-CM

## 2017-11-16 RX ORDER — OSTOMY SUPPLY 2 1/4"
EACH MISCELLANEOUS
Qty: 20 EACH | Refills: 5 | Status: SHIPPED | OUTPATIENT
Start: 2017-11-16 | End: 2017-12-27

## 2017-11-16 RX ORDER — OSTOMY IRRIGATOR 1 3/4"
EACH MISCELLANEOUS
Qty: 20 BOTTLE | Refills: 5 | Status: SHIPPED | OUTPATIENT
Start: 2017-11-16 | End: 2017-12-27 | Stop reason: SDUPTHER

## 2017-11-16 NOTE — TELEPHONE ENCOUNTER
Juju Alejandre, CNP [facility]   8/20/2018 11:00 AM MD Beth Connolly Pul[facility]   10/9/2018 10:10 AM Pedro Jeronimo CNM [facility]            Patient Active Problem List:     IBS (irritable bowel syndrome)     Anxiety     Rectal cancer ([facility] [address]     Ureteral stricture, right     H/O resection of rectum     CAD (coronary artery disease)     Asthma     CKD (chronic kidney disease) stage 3, GFR 30-59 ml/min     LUI (obstructive sleep apnea)     Malignant neoplasm of large intestine (HCC)     Depression     Hydroureteronephrosis     Adiposity     History of decompression of median nerve     Morbid (severe) obesity due to excess calories ([facility] [address]     History of rectal cancer     Obesity due to excess calories     Mild intermittent asthma without complication     Essential hypertension     Mixed hyperlipidemia     Elevated CEA

## 2017-12-11 ENCOUNTER — HOSPITAL ENCOUNTER (OUTPATIENT)
Dept: WOMENS IMAGING | Age: 67
Discharge: HOME | End: 2017-12-11
Payer: MEDICARE

## 2017-12-11 DIAGNOSIS — Z12.31 ENCOUNTER FOR SCREENING MAMMOGRAM FOR BREAST CANCER: ICD-10-CM

## 2017-12-11 PROCEDURE — G0202 SCR MAMMO BI INCL CAD: HCPCS

## 2017-12-13 ENCOUNTER — HOSPITAL ENCOUNTER (OUTPATIENT)
Dept: PHYSICAL THERAPY | Age: 67
Setting detail: THERAPIES SERIES
Discharge: HOME | End: 2017-12-13
Payer: MEDICARE

## 2017-12-13 PROCEDURE — G8979 MOBILITY GOAL STATUS: HCPCS

## 2017-12-13 PROCEDURE — 97161 PT EVAL LOW COMPLEX 20 MIN: CPT

## 2017-12-13 PROCEDURE — 97110 THERAPEUTIC EXERCISES: CPT

## 2017-12-13 PROCEDURE — G0283 ELEC STIM OTHER THAN WOUND: HCPCS

## 2017-12-13 PROCEDURE — G8978 MOBILITY CURRENT STATUS: HCPCS

## 2017-12-13 NOTE — PROGRESS NOTES
Phone: [address] [facility] [place]          Fax: [phone]                      Outpatient Physical Therapy                                                                      Evaluation  Date: 2017  Patient: Bryan Andrade  : 1950  [facility] #: [id number]  Referring Practitioner: Dr. Jessica Massey    Referral Date : 17     Diagnosis: R and L arthritis of knees    Treatment Diagnosis: difficulty walking and knee pain  Onset Date: 10/13/17  PT Insurance Information: Medicare primary  Total # of Visits Approved: 8   Total # of Visits to Date: 1  No Show: 0  Canceled Appointment: 0     Subjective  Subjective: Pt reports that she has significant stiffness in the knees and makes it difficult to walk around. Pt states it has gotten significantly worse over the last 2 to 3 months. Pt states prolonged standing is painful and she is unable to tolerate. Pt states pain is generally a 2/10 but does reach 4 or 5/10 depending on weather and activity. Additional Pertinent Hx: Ca clear since  (colon), anxiety, panic attacks, stroke, seizures    Objective     Observation/Palpation  Posture: Fair  Observation: pt has very difficult time standing up from chair height     Strength RLE  Comment: grossly 4+/5  Strength LLE  Comment: grossly 4+/5       Assessment  Assessment: Pt referred for [place] knee stiffness and arthritis. Pt states she has pain which does vary but her greatest difficulty is the stiffness. Pt does have a history of stroke but feels that due to the knee problem she has had worse difficulty due to the \"stiffness\" and limiting her activity. Progress as tolerated. Pt may go into pool as her skin clears up, pt is being seen by a dermatologist.  Prognosis: Fair        Decision Making: Low Complexity    Patient Education  Discussed ex and aquatics.  Pt will go into pool once her skin is clear  Pt verbalized/demonstrated good understanding:     [x] Yes         [] No, pt

## 2017-12-13 NOTE — PLAN OF CARE
with her HEP  Long term goal 2: Pt will be able to perform 3 consecutive sit to stands with min use of UEs indicating increased functional strength. Long term goal 3: Pt will tolerate up to 40 minutes of ex without increase in complaints of pain for better tolerance to ADLs and walking through the stores.     Prognosis  Prognosis: Fair    Treatment Plan   Times per week: 2  Plan weeks: 4  [x]HP/CP      [x]Electrical Stim   [x]Therapeutic Exercise      []Gait Training  [x]Aquatics   []Ultrasound         [x]Patient Education/HEP   [x]Manual Therapy  []Traction    [x]Neuro-mis        []Soft Tissue Mobs            []Home TENS  []Iontophoresis    []Orthotic casting/fitting      []Dry Needling             Electronically signed by: Beni Luna PT, DPT    Date: 12/13/2017      ______________________________________ Date: 12/13/2017   Physician Signature

## 2017-12-14 ENCOUNTER — APPOINTMENT (OUTPATIENT)
Dept: PHYSICAL THERAPY | Age: 67
End: 2017-12-14
Payer: MEDICARE

## 2017-12-15 ENCOUNTER — HOSPITAL ENCOUNTER (OUTPATIENT)
Dept: PHYSICAL THERAPY | Age: 67
Setting detail: THERAPIES SERIES
Discharge: HOME | End: 2017-12-15
Payer: MEDICARE

## 2017-12-15 PROCEDURE — 97110 THERAPEUTIC EXERCISES: CPT

## 2017-12-15 PROCEDURE — G0283 ELEC STIM OTHER THAN WOUND: HCPCS

## 2017-12-15 NOTE — PROGRESS NOTES
Phone: [place]           Fax: [phone]                           Outpatient Physical Therapy                                                                            Daily Note    Patient: Deshawn Friday : 1950  [facility] #: [id number]   Referring Practitioner:  Dr. Deloris Arteaga    Referral Date : 17     Date: 12/15/2017    Diagnosis: R and L arthritis of knees  Treatment Diagnosis: difficulty walking and knee pain    Onset Date: 10/13/17  PT Insurance Information: Medicare primary  Total # of Visits Approved: 8 Per Physician Order  Total # of Visits to Date: 2  No Show: 0  Canceled Appointment: 0      Pre-Treatment Pain:  0/10  Subjective: Pt reports she was okay following last therapy session with no increased pain noted. Pt states she \"really doesnt have pain\" today coming into therapy and states she \"just gets the pain twinges here and there\". Exercises:  Exercise 1: **HEP     Exercise 3: Sitting knee ext x20/Flex with T-band x20, green  Exercise 4: Recum bike x10 hills 1.0  Exercise 5: step stretch 3x30   Exercise 6: Standing toe raises/ hip abd 15x ea        Modalities:      Cryotherapy (Minutes\Location): 15' post haleigh Vinh knees        E-stim (parameters): 15' premod Vinh knees in seated for pain. Assessment  Assessment: Pt very limited on standing ex d/t endurance levels. Added standing ther-ex for increased BLE strength/ endurance. Patient Education  Cont current HEP, try to advance walking/ standing tolerance. Pt verbalized/demonstrated good understanding:     [x] Yes         [] No, pt required further clarification.     Post Treatment Pain:  0/10      Plan  Times per week: 2  Plan weeks: 4      Goals  (Total # of Visits to Date: 2)   Short Term Goals - Time Frame for Short term goals: 2 weeks     Short term goal 1: Initiate HEP                                        [x]Met   []Partially met  []Not met   Short term goal 2: Allow patient to get up with greater ease and less dependence on UEs. []Met  []Partially met  [x]Not met   Short term goal 3: Pt will be able to perform sit to stand x1 with minimal use of hands. []Met   []Partially met  [x]Not met      []Met   []Partially met  []Not met     Long Term Goals - Time Frame for Long term goals : 4 weeks  Long term goal 1: Pt will be independent and compliant with her HEP []Met  []Partially met  [x]Not met   Long term goal 2: Pt will be able to perform 3 consecutive sit to stands with min use of UEs indicating increased functional strength. []Met  []Partially met  [x]Not met   Long term goal 3: Pt will tolerate up to 40 minutes of ex without increase in complaints of pain for better tolerance to ADLs and walking through the stores.  []Met  []Partially met  [x]Not met     []Met  []Partially met  []Not met     []Met  []Partially met  []Not met       Minutes Tracking:  Time In: 0916  Time Out: [address]  Minutes: [address] [place], [place]    Date: 12/15/2017

## 2017-12-18 ENCOUNTER — HOSPITAL ENCOUNTER (OUTPATIENT)
Dept: PHYSICAL THERAPY | Age: 67
Setting detail: THERAPIES SERIES
Discharge: HOME | End: 2017-12-18
Payer: MEDICARE

## 2017-12-18 PROCEDURE — 97110 THERAPEUTIC EXERCISES: CPT

## 2017-12-18 PROCEDURE — G0283 ELEC STIM OTHER THAN WOUND: HCPCS

## 2017-12-19 ENCOUNTER — APPOINTMENT (OUTPATIENT)
Dept: PHYSICAL THERAPY | Age: 67
End: 2017-12-19
Payer: MEDICARE

## 2017-12-20 ENCOUNTER — HOSPITAL ENCOUNTER (OUTPATIENT)
Dept: PHYSICAL THERAPY | Age: 67
Setting detail: THERAPIES SERIES
Discharge: HOME | End: 2017-12-20
Payer: MEDICARE

## 2017-12-20 PROCEDURE — 97110 THERAPEUTIC EXERCISES: CPT

## 2017-12-20 PROCEDURE — G0283 ELEC STIM OTHER THAN WOUND: HCPCS

## 2017-12-20 NOTE — PROGRESS NOTES
Phone: [place]           Fax: [phone]                           Outpatient Physical Therapy                                                                            Daily Note    Patient: Duane Males : 1950  [facility] #: [id number]   Referring Practitioner:  Dr. Maryellen Perez    Referral Date : 17     Date: 2017    Diagnosis: R and L arthritis of knees  Treatment Diagnosis: difficulty walking and knee pain    Onset Date: 10/13/17  PT Insurance Information: Medicare primary  Total # of Visits Approved: 8 Per Physician Order  Total # of Visits to Date: 4  No Show: 0  Canceled Appointment: 0      Pre-Treatment Pain:  0/10  Subjective: Pt without complaints of pain this date, 0/10. States overall, believes knees are feeling better. Exercises:  Exercise 3: Sitting knee ext x20 4/# x15/Flex with T-band x20, green  Exercise 4: Recum bike x10 hills  Lv1.9  Exercise 5: step stretch 3x30/HS stretch 2x30 sec  Exercise 6: Standing toe raises/ hip abd/hip flexion 15x ea   Exercise 7: sit<>stand from 22\" mat, no UE x2 c/o pain on lateral knees - 15x        Modalities:  Cryotherapy (Minutes\Location): 15' post haleigh Vinh knees     E-stim (parameters): 15' premod Vinh knees in seated for pain. Assessment  Assessment: Fatigue noted with standing exercises, required sitting rest breaks. Pt without complaints of pain during exercises. Will continue to progress as pt tolerates. Patient Education  Continued stretching for HEP  Pt verbalized/demonstrated good understanding:     [x] Yes         [] No, pt required further clarification. Post Treatment Pain:  0/10      Plan  Times per week: 2  Plan weeks: 4      Goals  (Total # of Visits to Date: 4)   Short Term Goals - Time Frame for Short term goals: 2 weeks     Short term goal 1: Initiate HEP-Met.                                         []Met   []Partially met  []Not met   Short term goal 2: Allow patient to get up with greater ease and less dependence on UEs. -Progressing  []Met   []Partially met  []Not met   Short term goal 3: Pt will be able to perform sit to stand x1 with minimal use of hands.-Progressing  []Met   []Partially met  []Not met      []Met  []Partially met  []Not met     Long Term Goals - Time Frame for Long term goals : 4 weeks  Long term goal 1: Pt will be independent and compliant with her HEP []Met  []Partially met  []Not met   Long term goal 2: Pt will be able to perform 3 consecutive sit to stands with min use of UEs indicating increased functional strength. []Met  []Partially met  []Not met   Long term goal 3: Pt will tolerate up to 40 minutes of ex without increase in complaints of pain for better tolerance to ADLs and walking through the stores.  []Met  []Partially met  []Not met     []Met  []Partially met  []Not met     []Met  []Partially met  []Not met       Minutes Tracking:  Time In: 1015  Time Out: [address]  Minutes: 64    Renaldo Nunn PT, DPT Date: 12/20/2017

## 2017-12-22 ENCOUNTER — OFFICE VISIT (OUTPATIENT)
Dept: PRIMARY CARE CLINIC | Age: 67
End: 2017-12-22
Payer: MEDICARE

## 2017-12-22 VITALS
DIASTOLIC BLOOD PRESSURE: 71 MMHG | TEMPERATURE: 97.8 F | SYSTOLIC BLOOD PRESSURE: 137 MMHG | WEIGHT: 276.5 LBS | RESPIRATION RATE: 20 BRPM | HEART RATE: 70 BPM | BODY MASS INDEX: 44.63 KG/M2

## 2017-12-22 DIAGNOSIS — Z48.02 VISIT FOR SUTURE REMOVAL: Primary | ICD-10-CM

## 2017-12-22 PROCEDURE — 1123F ACP DISCUSS/DSCN MKR DOCD: CPT | Performed by: NURSE PRACTITIONER

## 2017-12-22 PROCEDURE — G8427 DOCREV CUR MEDS BY ELIG CLIN: HCPCS | Performed by: NURSE PRACTITIONER

## 2017-12-22 PROCEDURE — G8598 ASA/ANTIPLAT THER USED: HCPCS | Performed by: NURSE PRACTITIONER

## 2017-12-22 PROCEDURE — G8399 PT W/DXA RESULTS DOCUMENT: HCPCS | Performed by: NURSE PRACTITIONER

## 2017-12-22 PROCEDURE — G8417 CALC BMI ABV UP PARAM F/U: HCPCS | Performed by: NURSE PRACTITIONER

## 2017-12-22 PROCEDURE — 99213 OFFICE O/P EST LOW 20 MIN: CPT | Performed by: NURSE PRACTITIONER

## 2017-12-22 PROCEDURE — 4040F PNEUMOC VAC/ADMIN/RCVD: CPT | Performed by: NURSE PRACTITIONER

## 2017-12-22 PROCEDURE — 1090F PRES/ABSN URINE INCON ASSESS: CPT | Performed by: NURSE PRACTITIONER

## 2017-12-22 PROCEDURE — G8484 FLU IMMUNIZE NO ADMIN: HCPCS | Performed by: NURSE PRACTITIONER

## 2017-12-22 PROCEDURE — 3014F SCREEN MAMMO DOC REV: CPT | Performed by: NURSE PRACTITIONER

## 2017-12-22 PROCEDURE — 1036F TOBACCO NON-USER: CPT | Performed by: NURSE PRACTITIONER

## 2017-12-22 PROCEDURE — 3017F COLORECTAL CA SCREEN DOC REV: CPT | Performed by: NURSE PRACTITIONER

## 2017-12-22 ASSESSMENT — ENCOUNTER SYMPTOMS
GASTROINTESTINAL NEGATIVE: 1
EYES NEGATIVE: 1
RESPIRATORY NEGATIVE: 1

## 2017-12-22 NOTE — PROGRESS NOTES
Name: Sharee Brown  : 1950         Chief Complaint:     Chief Complaint   Patient presents with    Suture / Staple Removal     right leg, had biopsy per angelique Oseguera       History of Present Illness:      Sharee Brown is a 75 y.o.  female who presents with Suture / Staple Removal (right leg, had biopsy per angelique Oseguera)    Have you seen any other physician or provider since your last visit yes - angelique Oseguera    Have you had any other diagnostic tests since your last visit? yes -     Have you changed or stopped any medications since your last visit including any over-the-counter medicines, vitamins, or herbal medicines? no     Are you taking all your prescribed medications? Yes  If NO, why? -  N/A           Patient Self-Management Goal for this visit. What is your goal for your visit today? - Have sutrure removed. Barriers to success: none   Plan for overcoming my barriers: N/A      Confidence: 8/10   Date goal set: 17   Date expected to reach goal: 1week      Health Maintenance Due   Topic Date Due    Diabetes screen  10/13/2015     Had skin biopsy one week ago per dermatology, needs suture removal       Suture / Staple Removal   The sutures were placed 7 to 10 days ago (right leg, had biopsy per angelique Oseguera). She tried antibiotic ointment use since the wound repair. There has been no drainage from the wound. There is no redness present. There is no swelling present. There is no pain present. Past Medical History:     Past Medical History:   Diagnosis Date    Abnormal cardiac cath 10/11/2012     HWN45-69% mid vessel stenosis. 50-60% eccentric proximal stenosis was seen and a 70% stenosis at the 1st diagonal branch of the circumflex.      Anxiety     Asthma     CAD (coronary artery disease)     CKD (chronic kidney disease) stage 3, GFR 30-59 ml/min 2014    Colovaginal fistula 2012    Depression     Hyperlipidemia     Hypertension     IBS (irritable bowel syndrome)     Morbid (severe) obesity due to excess calories ([facility] [address] 12/7/2015    Multiple allergies     Obesity     Obesity due to excess calories 6/6/2016    LUI (obstructive sleep apnea) 6/8/2015    Rectal cancer ([facility] [address] 2012    SIRS due to infectious process without acute organ dysfunction ([facility] [address]     Sleep apnea     Unspecified cerebral artery occlusion with cerebral infarction     states that she had on 10/19/12, resulted in left sided  weakness      Reviewed all health maintenance requirements and ordered appropriate tests  Health Maintenance Due   Topic Date Due    Diabetes screen  10/13/2015       Past Surgical History:     Past Surgical History:   Procedure Laterality Date    ABDOMEN SURGERY      CARPAL TUNNEL RELEASE      Right     CHOLECYSTECTOMY  1992    COLECTOMY  2012    colovaginal fistula    COLONOSCOPY      several    COLONOSCOPY  08-    COLONOSCOPY  2/22/2016    -normal    CYSTOSCOPY      attempted rt. ureteral stent removal    CYSTOSCOPY  12/11/13    stent exchange    CYSTOURETHROSCOPY Right     w/ stent placemement    CYSTOURETHROSCOPY/STENT REMOVAL Right 03/25/2013    new stent inserted    CYSTOURETHROSCOPY/STENT REMOVAL Right 5/20/14    DILATION AND CURETTAGE OF UTERUS      HYSTERECTOMY, TOTAL ABDOMINAL  2012    KIDNEY SURGERY Right 2012    shunt     KNEE SURGERY      Right and Left    OTHER SURGICAL HISTORY  08-    Port removal     SHOULDER SURGERY      Left    TUBAL LIGATION      TUNNELED VENOUS PORT PLACEMENT  2013        Medications:       Prior to Admission medications    Medication Sig Start Date End Date Taking?  Authorizing Provider   atorvastatin (LIPITOR) 40 MG tablet Take 1 tablet by mouth daily 10/31/17  Yes Eliza Alejandre CNP   albuterol sulfate HFA (VENTOLIN HFA) 108 (90 Base) MCG/ACT inhaler Inhale 2 puffs into the lungs every 6 hours as needed for Wheezing 10/31/17  Yes Eliza Alejandre CNP traMADol (ULTRAM) 50 MG tablet Take 1 tablet by mouth 2 times daily as needed for Pain 10/31/17  Yes Giuseppe Mcclellan Might, JUNIE   fluticasone-salmeterol (ADVAIR HFA) 115-21 MCG/ACT inhaler Inhale 1 puff into the lungs 2 times daily 10/31/17  Yes Giuseppe Mcclellan Might, CNP   meloxicam (MOBIC) 7.5 MG tablet Take 1 tablet by mouth daily 7/17/17  Yes Giuseppe Mcclellan Might, CNP   hydrochlorothiazide (MICROZIDE) 12.5 MG capsule Take 1 capsule by mouth daily 5/3/17  Yes Giuseppe Mcclellan Might, CNP   losartan (COZAAR) 100 MG tablet Take 1 tablet by mouth daily 5/3/17  Yes Giuseppe Mcclellan Might, CNP   clopidogrel (PLAVIX) 75 MG tablet Take 1 tablet by mouth daily 5/3/17  Yes Giuseppe Mcclellan Might, CNP   citalopram (CELEXA) 20 MG tablet Take 1 tablet by mouth daily 5/3/17  Yes Giuseppe Mcclellan Might, CNP   LORazepam (ATIVAN) 0.5 MG tablet Take 1 tablet by mouth 2 times daily as needed for Anxiety 10/24/16  Yes Giuseppe Alejandre, CNP   Cholecalciferol (VITAMIN D) 2000 UNITS CAPS capsule Take 1 capsule by mouth daily    Yes Historical Provider, MD   calcitRIOL (ROCALTROL) 0.25 MCG capsule Take 0.25 mcg by mouth daily. Yes Historical Provider, MD   aspirin EC 81 MG EC tablet Take 1 tablet by mouth daily. 1/7/13  Yes Aslhey Arias MD   levetiracetam (KEPPRA) 500 MG tablet Take 500 mg by mouth 2 times daily. Yes Historical Provider, MD   Ostomy Supplies (MIGDALIA-FIT NATURA DURAHESIVE 45MM) MISC USE AS DIRECTED 11/16/17   Giuseppe Alejandre, CNP   Ostomy Supplies (MIGDALIA-FIT NATURA DRAINABLE [place]) Pouch MISC USE AS DIRECTED 11/16/17   Giuseppe Alejandre, CNP   Ostomy Supplies (BRAVA MOLDABLE RING) MISC USE AS DIRECTED 11/16/17   Giuseppe Alejandre, JUNIE   nystatin (MYCOSTATIN) 322235 UNIT/GM powder Apply topically 4 times daily. 6/14/16   Giuseppe Alejandre, CNP        Allergies:       Zithromax [azithromycin dihydrate] and Seasonal    Social History:     Tobacco:    reports that she quit smoking about 25 years ago. Her smoking use included Cigarettes. She has a 2.50 pack-year smoking history.  She has never used smokeless tobacco.  Alcohol:      reports that she does not drink alcohol. Drug Use:  reports that she does not use drugs. Family History:     Family History   Problem Relation Age of Onset    High Blood Pressure Mother     Heart Disease Mother       of CHF    Cancer Father      carcinoma of lung       Review of Systems:     Positive and Negative as described in HPI    Review of Systems   Constitutional: Negative. Negative for activity change, appetite change and fever. HENT: Negative. Eyes: Negative. Respiratory: Negative. Cardiovascular: Negative. Gastrointestinal: Negative. Genitourinary: Negative. Skin: Positive for wound. Right thigh sutures   Neurological: Negative. Physical Exam:   Vitals: There were no vitals taken for this visit. Physical Exam   Constitutional: She is oriented to person, place, and time. Vital signs are normal. She appears well-developed and well-nourished. Non-toxic appearance. She does not appear ill. No distress. Appears well hydrated and non toxic. Sitting upright on exam table without distress. Respirations are regular, non labored and quiet. HENT:   Head: Normocephalic and atraumatic. Nose: Nose normal.   Mouth/Throat: Uvula is midline, oropharynx is clear and moist and mucous membranes are normal.   Eyes: EOM are normal. Pupils are equal, round, and reactive to light. Neck: Normal range of motion. Neck supple. No tracheal deviation present. Cardiovascular: Normal rate, regular rhythm, normal heart sounds and intact distal pulses. Exam reveals no gallop and no friction rub. No murmur heard. Pulses:       Radial pulses are 2+ on the right side. Pulmonary/Chest: Effort normal and breath sounds normal. No accessory muscle usage. No tachypnea. No respiratory distress. She has no decreased breath sounds. She has no wheezes. She has no rhonchi. She has no rales.    Breath sounds are clear to auscultation over

## 2017-12-22 NOTE — PATIENT INSTRUCTIONS
Patient Education        Learning About Stitches and Staples Removal  When are stitches and staples removed? Your doctor will tell you when to have your stitches or staples removed, usually in 7 to 14 days. How long you'll be told to wait will depend on things like where the wound is located, how big and how deep the wound is, and what your general health is like. Do not remove the stitches on your own. Stitches on the face are usually removed within a week. But stitches and staples on other areas of the body, such as on the back or belly or over a joint, may need to stay in place longer, often a week or two. Be sure to follow your doctor's instructions. How are stitches and staples removed? It usually doesn't hurt when the doctor removes the stitches or staples. You may feel a tug as each stitch or staple is removed. · You will either be seated or lying down. · To remove stitches, the doctor will use scissors to cut each of the knots and then pull the threads out. · To remove staples, the doctor will use a tool to take out the staples one at a time. · The area may still feel tender after the stitches or staples are gone. But it should feel better within a few minutes or up to a few hours. What can you expect after stitches and staples are removed? Depending on the type and location of the cut, you will have a scar. Scars usually fade over time. Keep the area clean, but you won't need a bandage. When should you call for help? Call your doctor now or seek immediate medical care if :  · You have new pain, or your pain gets worse. · You have trouble moving the area near the scar. · You have symptoms of infection, such as:  ¨ Increased pain, swelling, warmth, or redness around the scar. ¨ Red streaks leading from the scar. ¨ Pus draining from the scar. ¨ A fever. Watch closely for changes in your health, and be sure to contact your doctor if:  · The scar opens.   · You do not get better as

## 2017-12-27 ENCOUNTER — HOSPITAL ENCOUNTER (OUTPATIENT)
Dept: PHYSICAL THERAPY | Age: 67
Setting detail: THERAPIES SERIES
Discharge: HOME | End: 2017-12-27
Payer: MEDICARE

## 2017-12-27 PROCEDURE — G0283 ELEC STIM OTHER THAN WOUND: HCPCS

## 2017-12-27 PROCEDURE — 97110 THERAPEUTIC EXERCISES: CPT

## 2017-12-27 RX ORDER — OSTOMY SUPPLY 2 1/4"
EACH MISCELLANEOUS
Qty: 5 EACH | Refills: 0 | Status: SHIPPED | OUTPATIENT
Start: 2017-12-27 | End: 2018-03-09 | Stop reason: SDUPTHER

## 2017-12-27 RX ORDER — OSTOMY IRRIGATOR 1 3/4"
EACH MISCELLANEOUS
Qty: 20 BOTTLE | Refills: 5 | Status: SHIPPED | OUTPATIENT
Start: 2017-12-27 | End: 2018-03-09 | Stop reason: SDUPTHER

## 2017-12-27 NOTE — PROGRESS NOTES
Phone: [phone] [facility]           Fax: [phone]                           Outpatient Physical Therapy                                                                            Daily Note    Patient: Denisha Bennett : 1950  CSN #: [id number]   Referring Practitioner:  Dr. Mariama Guevara    Referral Date : 17     Date: 2017    Diagnosis: R and L arthritis of knees  Treatment Diagnosis: difficulty walking and knee pain    Onset Date: 10/13/17  PT Insurance Information: Medicare primary  Total # of Visits Approved: 8 Per Physician Order  Total # of Visits to Date: 5  No Show: 0  Canceled Appointment: 0      Pre-Treatment Pain:  0/10  Subjective: Pt reports she feels that her Vinh knees are getting better with therapy but would like to see endurance a little better. Exercises:  Exercise 1: **HEP: seated/supine-reclined, no bridges (pt sleeps in recliner)        Exercise 4: Recum bike x10 hills  Lv 2.5  Exercise 5: step stretch 3x30/HS stretch 2x30 sec  Exercise 6: Standing Sinks 15x ea   Exercise 7: sit<>stand from 22\" mat, no UE x2 c/o pain on lateral knees - 15x  Exercise 8: FSU/LSU 6 inch 15x ea         Modalities:      Cryotherapy (Minutes\Location): 15' post haleigh Vinh knees        E-stim (parameters): 15' premod Vinh knees in seated for pain. Assessment  Assessment: Pt able to complete approx 5 mins of standing ther-ex prior to needing rest break d/t fatigue. Added forward and lateral step ups today to increased BLE strength aimed at greater functional mobility. Patient Education  Cont current HEP. Pt verbalized/demonstrated good understanding:     [x] Yes         [] No, pt required further clarification. Post Treatment Pain:  0/10      Plan  Times per week: 2  Plan weeks: 4      Goals  (Total # of Visits to Date: 5)   Short Term Goals - Time Frame for Short term goals: 2 weeks     Short term goal 1: Initiate HEP-Met. [x]Met   []Partially met  []Not met   Short term goal 2: Allow patient to get up with greater ease and less dependence on UEs. -Progressing  []Met  [x]Partially met  []Not met   Short term goal 3: Pt will be able to perform sit to stand x1 with minimal use of hands.-Progressing  []Met   [x]Partially met  []Not met      []Met   []Partially met  []Not met     Long Term Goals - Time Frame for Long term goals : 4 weeks  Long term goal 1: Pt will be independent and compliant with her HEP []Met  []Partially met  [x]Not met   Long term goal 2: Pt will be able to perform 3 consecutive sit to stands with min use of UEs indicating increased functional strength. []Met  []Partially met  [x]Not met   Long term goal 3: Pt will tolerate up to 40 minutes of ex without increase in complaints of pain for better tolerance to ADLs and walking through the stores.  []Met  []Partially met  [x]Not met     []Met  []Partially met  []Not met     []Met  []Partially met  []Not met       Minutes Tracking:  Time In: 0930  Time Out: 21   Minutes: [place] [address] [place] [address]   Date: 12/27/2017

## 2017-12-27 NOTE — TELEPHONE ENCOUNTER
Health Maintenance   Topic Date Due    Diabetes screen  10/13/2015    DTaP/Tdap/Td vaccine (1 - Tdap) 02/02/2018 (Originally 2/18/1969)    Pneumococcal low/med risk (2 of 2 - PPSV23) 02/02/2018    Breast cancer screen  12/11/2019    Colon cancer screen colonoscopy  02/22/2021    Lipid screen  04/12/2022    Zostavax vaccine  Addressed    DEXA (modify frequency per FRAX score)  Addressed    Flu vaccine  Completed    Hepatitis C screen  Completed             (applicable per patient's age: Cancer Screenings, Depression Screening, Fall Risk Screening, Immunizations)    Hemoglobin A1C (%)   Date Value   10/13/2012 4.7     Microalb/Crt.  Ratio (mcg/mg creat)   Date Value   04/12/2017 11     LDL Cholesterol (mg/dL)   Date Value   04/12/2017 78     AST (U/L)   Date Value   08/09/2017 18     ALT (U/L)   Date Value   08/09/2017 17     BUN (mg/dL)   Date Value   11/06/2017 17      (goal A1C is < 7)   (goal LDL is <100) need 30-50% reduction from baseline     BP Readings from Last 3 Encounters:   12/22/17 137/71   10/31/17 125/63   10/04/17 130/82    (goal /80)      All Future Testing planned in [facility]:  Lab Frequency Next Occurrence   CEA Once 02/27/2018   Comprehensive Metabolic Panel Once 27/61/0702   CBC Auto Differential Once 02/27/2018   CBC Auto Differential Once 04/29/2018   Comprehensive Metabolic Panel Once 36/17/7735   Lipid Panel Once 04/29/2018   Microalbumin, Ur Once 04/29/2018   CEA     CBC Auto Differential     Comprehensive Metabolic Panel         Next Visit Date:  Future Appointments  Date Time Provider [place]   12/27/2017 9:30 AM Rhiannon Zuñiga, PTA [facility]   12/29/2017 9:30 AM Reny Torres, PT [facility]   1/2/2018 9:30 AM Rhiannon Zuñiga, PTA [facility]   1/5/2018 9:30 AM Rhiannon Zuñiga, PTA [facility]   1/10/2018 9:30 AM Rhiannon Zuñiga, PTA [facility]   1/12/2018 9:30 AM Rhiannon Zuñiga, PTA [facility]   2/27/2018 12:00 PM [facility]   2/27/2018 1:00 PM [facility] CAT SCAN ROOM [facility]   2/27/2018 1:30 PM [facility] CAT SCAN ROOM [facility] Rad   3/7/2018 11:00 AM Darcie Chris MD [facility]   4/4/2018 10:30 AM MD Jimmy Flood [facility]   4/26/2018 2:00 PM Winter Alejandre, CNP [facility] Prim [facility]   8/20/2018 11:00 AM MD Beth Lassiter Pulmon [facility]   10/9/2018 10:10 AM Nia Huerta CNM [facility]            Patient Active Problem List:     IBS (irritable bowel syndrome)     Anxiety     Rectal cancer (HCC)     Ureteral stricture, right     H/O resection of rectum     CAD (coronary artery disease)     Asthma     CKD (chronic kidney disease) stage 3, GFR 30-59 ml/min     LUI (obstructive sleep apnea)     Malignant neoplasm of large intestine (HCC)     Depression     Hydroureteronephrosis     Adiposity     History of decompression of median nerve     Morbid (severe) obesity due to excess calories (HCC)     History of rectal cancer     Obesity due to excess calories     Mild intermittent asthma without complication     Essential hypertension     Mixed hyperlipidemia     Elevated CEA

## 2017-12-27 NOTE — TELEPHONE ENCOUNTER
Next Visit Date:  Future Appointments  Date Time Provider Pelon Coheni   12/27/2017 9:30 AM Meri Enriquez, PTA [facility]   12/29/2017 9:30 AM Everette Olivier, PT [facility]   1/2/2018 9:30 AM Meri Enriquez, PTA [facility]   1/5/2018 9:30 AM Meri Enriquez, PTA [facility]   1/10/2018 9:30 AM Meri Enriquez, PTA [facility]   1/12/2018 9:30 AM Meri Enriquez, PTA [facility]   2/27/2018 12:00 PM [facility] DRAWING ROOM [facility] None   2/27/2018 1:00 PM [facility] CAT SCAN ROOM [facility]   2/27/2018 1:30 PM [facility] CAT SCAN ROOM [facility] CT [facility]   3/7/2018 11:00 AM Stephanie Luque MD [facility] On[facility]   4/4/2018 10:30 AM Carolina Peralta MD [facility] Urol [facility]   4/26/2018 2:00 PM Donavan Alejandre, CNP [facility] Prim Ca [facility]   8/20/2018 11:00 AM MD Toy Fisherin Pulmon [facility]   10/9/2018 10:10 AM Gracie Choi CNM [facility] Ob/Gyn [facility]       Health Maintenance   Topic Date Due    Diabetes screen  10/13/2015    DTaP/Tdap/Td vaccine (1 - Tdap) 02/02/2018 (Originally 2/18/1969)    Pneumococcal low/med risk (2 of 2 - PPSV23) 02/02/2018    Breast cancer screen  12/11/2019    Colon cancer screen colonoscopy  02/22/2021    Lipid screen  04/12/2022    Zostavax vaccine  Addressed    DEXA (modify frequency per FRAX score)  Addressed    Flu vaccine  Completed    Hepatitis C screen  Completed       Hemoglobin A1C (%)   Date Value   10/13/2012 4.7             ( goal A1C is < 7)   Microalb/Crt.  Ratio (mcg/mg creat)   Date Value   04/12/2017 11     LDL Cholesterol (mg/dL)   Date Value   04/12/2017 78       (goal LDL is <100)   AST (U/L)   Date Value   08/09/2017 18     ALT (U/L)   Date Value   08/09/2017 17     BUN (mg/dL)   Date Value   11/06/2017 17     BP Readings from Last 3 Encounters:   12/22/17 137/71   10/31/17 125/63   10/04/17 130/82          (goal 120/80)    All Future Testing planned in [facility]  Lab Frequency Next Occurrence   CEA Once 02/27/2018   Comprehensive Metabolic Panel Once 02/27/2018   CBC Auto Differential Once 02/27/2018   CBC Auto Differential Once 04/29/2018   Comprehensive Metabolic Panel Once 06/25/8833   Lipid Panel Once 04/29/2018   Microalbumin, Ur Once 04/29/2018   CEA     CBC Auto Differential     Comprehensive Metabolic Panel                 Patient Active Problem List:     IBS (irritable bowel syndrome)     Anxiety     Rectal cancer (HCC)     Ureteral stricture, right     H/O resection of rectum     CAD (coronary artery disease)     Asthma     CKD (chronic kidney disease) stage 3, GFR 30-59 ml/min     LUI (obstructive sleep apnea)     Malignant neoplasm of large intestine (HCC)     Depression     Hydroureteronephrosis     Adiposity     History of decompression of median nerve     Morbid (severe) obesity due to excess calories (HCC)     History of rectal cancer     Obesity due to excess calories     Mild intermittent asthma without complication     Essential hypertension     Mixed hyperlipidemia     Elevated CEA

## 2017-12-29 ENCOUNTER — HOSPITAL ENCOUNTER (OUTPATIENT)
Dept: PHYSICAL THERAPY | Age: 67
Setting detail: THERAPIES SERIES
Discharge: HOME | End: 2017-12-29
Payer: MEDICARE

## 2017-12-29 PROCEDURE — 97110 THERAPEUTIC EXERCISES: CPT

## 2017-12-29 PROCEDURE — G0283 ELEC STIM OTHER THAN WOUND: HCPCS

## 2018-01-02 ENCOUNTER — HOSPITAL ENCOUNTER (OUTPATIENT)
Dept: PHYSICAL THERAPY | Age: 68
Setting detail: THERAPIES SERIES
End: 2018-01-02
Payer: MEDICARE

## 2018-01-02 NOTE — PROGRESS NOTES
[x]Met   []Partially met  []Not met   Short term goal 2: Allow patient to get up with greater ease and less dependence on UEs. - Met  [x]Met   []Partially met  []Not met   Short term goal 3: Pt will be able to perform sit to stand x1 with minimal use of hands. - Met  [x]Met   []Partially met  []Not met      []Met   []Partially met  []Not met     Long Term Goals - Time Frame for Long term goals : 4 weeks  Long term goal 1: Pt will be independent and compliant with her HEP []Met  []Partially met  []Not met   Long term goal 2: Pt will be able to perform 3 consecutive sit to stands with min use of UEs indicating increased functional strength. []Met  []Partially met  []Not met   Long term goal 3: Pt will tolerate up to 40 minutes of ex without increase in complaints of pain for better tolerance to ADLs and walking through the stores.  []Met  []Partially met  []Not met     []Met  []Partially met  []Not met     []Met  []Partially met  []Not met       Minutes Tracking:  Time In: 0929  Time Out: [facility]  Minutes: [address]  PT, DPT                 Date: 12/29/2017

## 2018-01-05 ENCOUNTER — HOSPITAL ENCOUNTER (OUTPATIENT)
Dept: PHYSICAL THERAPY | Age: 68
Setting detail: THERAPIES SERIES
Discharge: HOME | End: 2018-01-05
Payer: MEDICARE

## 2018-01-05 PROCEDURE — G0283 ELEC STIM OTHER THAN WOUND: HCPCS

## 2018-01-05 PROCEDURE — 97110 THERAPEUTIC EXERCISES: CPT

## 2018-01-05 NOTE — PROGRESS NOTES
[]Partially met  []Not met   Short term goal 2: Allow patient to get up with greater ease and less dependence on UEs. - Met  [x]Met  []Partially met  []Not met   Short term goal 3: Pt will be able to perform sit to stand x1 with minimal use of hands. - Met  [x]Met   []Partially met  []Not met      []Met   []Partially met  []Not met     Long Term Goals - Time Frame for Long term goals : 4 weeks  Long term goal 1: Pt will be independent and compliant with her HEP []Met  []Partially met  [x]Not met   Long term goal 2: Pt will be able to perform 3 consecutive sit to stands with min use of UEs indicating increased functional strength. -MET [x]Met  []Partially met  []Not met   Long term goal 3: Pt will tolerate up to 40 minutes of ex without increase in complaints of pain for better tolerance to ADLs and walking through the stores.  []Met  []Partially met  [x]Not met     []Met  []Partially met  []Not met     []Met  []Partially met  []Not met       Minutes Tracking:  Time In: 0932  Time Out: [address]  Minutes: [address] [place], [place]    Date: 1/5/2018

## 2018-01-10 ENCOUNTER — HOSPITAL ENCOUNTER (OUTPATIENT)
Dept: PHYSICAL THERAPY | Age: 68
Setting detail: THERAPIES SERIES
Discharge: HOME | End: 2018-01-10
Payer: MEDICARE

## 2018-01-10 PROCEDURE — 97110 THERAPEUTIC EXERCISES: CPT

## 2018-01-10 PROCEDURE — G0283 ELEC STIM OTHER THAN WOUND: HCPCS

## 2018-01-12 ENCOUNTER — HOSPITAL ENCOUNTER (OUTPATIENT)
Dept: PHYSICAL THERAPY | Age: 68
Setting detail: THERAPIES SERIES
End: 2018-01-12
Payer: MEDICARE

## 2018-01-12 NOTE — PROGRESS NOTES
[facility]  Inpatient/Observation/Outpatient Rehabilitation    Date: 2018  Patient Name: Nasir Osorio       [] Inpatient Acute/Observation       [x]  Outpatient  : 1950   [x] Pt cancelled due to:   [x] Other: Patient cancelled today due to weather conditions. ...rescheduled as needed with patient approval and confirmation  Archie Phillip Date: 2018

## 2018-01-15 ENCOUNTER — HOSPITAL ENCOUNTER (OUTPATIENT)
Dept: PHYSICAL THERAPY | Age: 68
Setting detail: THERAPIES SERIES
Discharge: HOME | End: 2018-01-15
Payer: MEDICARE

## 2018-01-15 PROCEDURE — G0283 ELEC STIM OTHER THAN WOUND: HCPCS

## 2018-01-15 PROCEDURE — 97110 THERAPEUTIC EXERCISES: CPT

## 2018-02-01 RX ORDER — MELOXICAM 7.5 MG/1
7.5 TABLET ORAL DAILY
Qty: 90 TABLET | Refills: 1 | Status: SHIPPED | OUTPATIENT
Start: 2018-02-01 | End: 2018-08-17 | Stop reason: SDUPTHER

## 2018-02-27 ENCOUNTER — HOSPITAL ENCOUNTER (OUTPATIENT)
Dept: CT IMAGING | Age: 68
Discharge: HOME | End: 2018-03-01
Payer: MEDICARE

## 2018-02-27 ENCOUNTER — HOSPITAL ENCOUNTER (OUTPATIENT)
Dept: LAB | Age: 68
Discharge: HOME | End: 2018-02-27
Payer: MEDICARE

## 2018-02-27 DIAGNOSIS — C20 RECTAL CANCER (HCC): ICD-10-CM

## 2018-02-27 DIAGNOSIS — Z85.048 HISTORY OF RECTAL CANCER: ICD-10-CM

## 2018-02-27 LAB
ABSOLUTE EOS #: 0.38 K/UL (ref 0–0.44)
ABSOLUTE IMMATURE GRANULOCYTE: 0.13 K/UL (ref 0–0.3)
ABSOLUTE LYMPH #: 2.15 K/UL (ref 1.1–3.7)
ABSOLUTE MONO #: 0.55 K/UL (ref 0.1–1.2)
ALBUMIN SERPL-MCNC: 3.8 G/DL (ref 3.5–5.2)
ALBUMIN/GLOBULIN RATIO: 1.2 (ref 1–2.5)
ALP BLD-CCNC: 117 U/L (ref 35–104)
ALT SERPL-CCNC: 17 U/L (ref 5–33)
ANION GAP SERPL CALCULATED.3IONS-SCNC: 13 MMOL/L (ref 9–17)
AST SERPL-CCNC: 19 U/L
BASOPHILS # BLD: 1 % (ref 0–2)
BASOPHILS ABSOLUTE: 0.06 K/UL (ref 0–0.2)
BILIRUB SERPL-MCNC: 0.3 MG/DL (ref 0.3–1.2)
BUN BLDV-MCNC: 21 MG/DL (ref 8–23)
BUN/CREAT BLD: 21 (ref 9–20)
CALCIUM SERPL-MCNC: 9.5 MG/DL (ref 8.6–10.4)
CARCINOEMBRYONIC ANTIGEN: 50.4 NG/ML
CHLORIDE BLD-SCNC: 100 MMOL/L (ref 98–107)
CO2: 26 MMOL/L (ref 20–31)
CREAT SERPL-MCNC: 0.99 MG/DL (ref 0.5–0.9)
DIFFERENTIAL TYPE: ABNORMAL
EOSINOPHILS RELATIVE PERCENT: 4 % (ref 1–4)
GFR AFRICAN AMERICAN: >60 ML/MIN
GFR NON-AFRICAN AMERICAN: 56 ML/MIN
GFR SERPL CREATININE-BSD FRML MDRD: ABNORMAL ML/MIN/{1.73_M2}
GFR SERPL CREATININE-BSD FRML MDRD: ABNORMAL ML/MIN/{1.73_M2}
GLUCOSE BLD-MCNC: 96 MG/DL (ref 70–99)
HCT VFR BLD CALC: 41.3 % (ref 36.3–47.1)
HEMOGLOBIN: 13.4 G/DL (ref 11.9–15.1)
IMMATURE GRANULOCYTES: 1 %
LYMPHOCYTES # BLD: 23 % (ref 24–43)
MCH RBC QN AUTO: 25.9 PG (ref 25.2–33.5)
MCHC RBC AUTO-ENTMCNC: 32.4 G/DL (ref 28.4–34.8)
MCV RBC AUTO: 79.7 FL (ref 82.6–102.9)
MONOCYTES # BLD: 6 % (ref 3–12)
NRBC AUTOMATED: 0 PER 100 WBC
PDW BLD-RTO: 14.1 % (ref 11.8–14.4)
PLATELET # BLD: 255 K/UL (ref 138–453)
PLATELET ESTIMATE: ABNORMAL
PMV BLD AUTO: 10.4 FL (ref 8.1–13.5)
POTASSIUM SERPL-SCNC: 3.8 MMOL/L (ref 3.7–5.3)
RBC # BLD: 5.18 M/UL (ref 3.95–5.11)
RBC # BLD: ABNORMAL 10*6/UL
SEG NEUTROPHILS: 65 % (ref 36–65)
SEGMENTED NEUTROPHILS ABSOLUTE COUNT: 6.12 K/UL (ref 1.5–8.1)
SODIUM BLD-SCNC: 139 MMOL/L (ref 135–144)
TOTAL PROTEIN: 6.9 G/DL (ref 6.4–8.3)
WBC # BLD: 9.4 K/UL (ref 3.5–11.3)
WBC # BLD: ABNORMAL 10*3/UL

## 2018-02-27 PROCEDURE — 82378 CARCINOEMBRYONIC ANTIGEN: CPT

## 2018-02-27 PROCEDURE — 74177 CT ABD & PELVIS W/CONTRAST: CPT

## 2018-02-27 PROCEDURE — 71260 CT THORAX DX C+: CPT

## 2018-02-27 PROCEDURE — 6360000004 HC RX CONTRAST MEDICATION: Performed by: INTERNAL MEDICINE

## 2018-02-27 PROCEDURE — 80053 COMPREHEN METABOLIC PANEL: CPT

## 2018-02-27 PROCEDURE — 36415 COLL VENOUS BLD VENIPUNCTURE: CPT

## 2018-02-27 PROCEDURE — 85025 COMPLETE CBC W/AUTO DIFF WBC: CPT

## 2018-02-27 RX ADMIN — IOVERSOL 100 ML: 678 INJECTION INTRA-ARTERIAL; INTRAVENOUS at 13:26

## 2018-03-07 ENCOUNTER — OFFICE VISIT (OUTPATIENT)
Dept: ONCOLOGY | Age: 68
End: 2018-03-07
Payer: MEDICARE

## 2018-03-07 VITALS
HEART RATE: 85 BPM | DIASTOLIC BLOOD PRESSURE: 65 MMHG | RESPIRATION RATE: 20 BRPM | WEIGHT: 275 LBS | HEIGHT: 66 IN | SYSTOLIC BLOOD PRESSURE: 124 MMHG | TEMPERATURE: 97.6 F | BODY MASS INDEX: 44.2 KG/M2

## 2018-03-07 DIAGNOSIS — R97.0 ELEVATED CEA: ICD-10-CM

## 2018-03-07 DIAGNOSIS — C20 RECTAL CANCER (HCC): Primary | ICD-10-CM

## 2018-03-07 PROCEDURE — G8417 CALC BMI ABV UP PARAM F/U: HCPCS | Performed by: INTERNAL MEDICINE

## 2018-03-07 PROCEDURE — G8598 ASA/ANTIPLAT THER USED: HCPCS | Performed by: INTERNAL MEDICINE

## 2018-03-07 PROCEDURE — 1123F ACP DISCUSS/DSCN MKR DOCD: CPT | Performed by: INTERNAL MEDICINE

## 2018-03-07 PROCEDURE — 3017F COLORECTAL CA SCREEN DOC REV: CPT | Performed by: INTERNAL MEDICINE

## 2018-03-07 PROCEDURE — 1090F PRES/ABSN URINE INCON ASSESS: CPT | Performed by: INTERNAL MEDICINE

## 2018-03-07 PROCEDURE — G8399 PT W/DXA RESULTS DOCUMENT: HCPCS | Performed by: INTERNAL MEDICINE

## 2018-03-07 PROCEDURE — 3014F SCREEN MAMMO DOC REV: CPT | Performed by: INTERNAL MEDICINE

## 2018-03-07 PROCEDURE — G8427 DOCREV CUR MEDS BY ELIG CLIN: HCPCS | Performed by: INTERNAL MEDICINE

## 2018-03-07 PROCEDURE — G8484 FLU IMMUNIZE NO ADMIN: HCPCS | Performed by: INTERNAL MEDICINE

## 2018-03-07 PROCEDURE — 1036F TOBACCO NON-USER: CPT | Performed by: INTERNAL MEDICINE

## 2018-03-07 PROCEDURE — 99214 OFFICE O/P EST MOD 30 MIN: CPT | Performed by: INTERNAL MEDICINE

## 2018-03-07 PROCEDURE — 4040F PNEUMOC VAC/ADMIN/RCVD: CPT | Performed by: INTERNAL MEDICINE

## 2018-03-07 NOTE — LETTER
CO2 26 02/27/2018    BUN 21 02/27/2018    CREATININE 0.99 02/27/2018    GLUCOSE 96 02/27/2018    CALCIUM 9.5 02/27/2018     Lab Results   Component Value Date    WBC 9.4 02/27/2018    HGB 13.4 02/27/2018    HCT 41.3 02/27/2018    MCV 79.7 02/27/2018     02/27/2018     Lab Results   Component Value Date    CEA 50.4 (H) 02/27/2018       PET CT scan was reviewed and was essentially negative      Assessment:    1. Locally advanced rectal cancer, treated by LAR, followed by adjuvant chemoradiation and chemo, all treatment ended June 11, 2013.   2. Rising CEA, puzzling to me. The numbers continue to rise. She is asymptomatic and colonoscopy, CT   scan has been all negative. I am very concerned about occult metastatic disease   3. Right-sided hydronephrosis, is a chronic and stable  4. Stable comorbidities, including obesity and possibly sleep apnea      Plan:     1. I had a long discussion with the patient. CT scan was reviewed , CEA continued to rise unfortunately. CT scans show no evidence of cancer recurrence. 2. Colonoscopy was done in February/2016 and was essentially negative. She does not smoke, but her  is a heavy smoker. Unfortunately, I'm concerned enough that she has occult metastatic disease. We will order a PET CT scan. Makenna Luque MD  Hematologist/Medical Oncologist  Cell: [phone]                           If you have questions, please do not hesitate to call me. I look forward to following Elvis Chase along with you.     Sincerely,    Jose M Camacho MD  Hematology/ Oncology  Cell [phone]

## 2018-03-07 NOTE — PROGRESS NOTES
medical history of Abnormal cardiac cath; Anxiety; Asthma; CAD (coronary artery disease); CKD (chronic kidney disease) stage 3, GFR 30-59 ml/min; Colovaginal fistula; Depression; Hyperlipidemia; Hypertension; IBS (irritable bowel syndrome); Morbid (severe) obesity due to excess calories ([facility] [address] Multiple allergies; Obesity; Obesity due to excess calories; LUI (obstructive sleep apnea); Rectal cancer ([facility] [address] SIRS due to infectious process without acute organ dysfunction ([facility] [address] Sleep apnea; and Unspecified cerebral artery occlusion with cerebral infarction. Surgical History   has a past surgical history that includes Cholecystectomy (1992); Carpal tunnel release; shoulder surgery; knee surgery; Dilation and curettage of uterus; Tubal ligation; colectomy (2012); Kidney surgery (Right, 2012); Tunneled venous port placement (2013); Abdomen surgery; cystourethroscopy/stent removal (Right, 03/25/2013); other surgical history (08-); Cystocopy; Cystocopy (12/11/13); cystourethroscopy (Right); cystourethroscopy/stent removal (Right, 5/20/14); Colonoscopy; Colonoscopy (08-); Colonoscopy (2/22/2016); and Hysterectomy, total abdominal (2012). Home Medications  has a current medication list which includes the following prescription(s): meloxicam, atorvastatin, albuterol sulfate hfa, tramadol, fluticasone-salmeterol, hydrochlorothiazide, losartan, clopidogrel, citalopram, lorazepam, vitamin d, calcitriol, aspirin ec, levetiracetam, addison-fit natura drainable pouch, addison-fit natura durahesive 45mm, brava moldable ring, and nystatin. Allergies:  Zithromax [azithromycin dihydrate] and Seasonal        Review of Systems :    Constitutional: No fever or chills.  No night sweats, Fatigue ,   HEENT: negative for sore mouth, sore throat, hoarseness and voice change   Respiratory: negative for cough , sputum, dyspnea, wheezing, hemoptysis, chest pain   Cardiovascular: negative for chest pain, dyspnea, palpitations, HYDROURETER, EXTENDING INTO THE MID TO LOWER PELVIS WITHOUT APPARENT CALCULI. NO    EVIDENCE OF RECURRENT TUMOR OR METASTATIC DISEASE AT THIS TIME. Ct s can 2/18  FINDINGS: Compared to 8/9/2017. Stable subsegmental atelectasis right lower lobe. No focal consolidation, mass lesion or pleural effusion. No mediastinal or hilar lymphadenopathy. Normal cardiac size. Nonaneurysmal thoracic aorta. Hypertrophic    degenerative changes thoracic spine.            FINDINGS: Compared to CT 8/9/2017, 1/26/2016 and PET 8/10/2016       ABDOMEN: The visualized lung bases are clear. The liver is normal in size and contour. No focal hepatic lesion is identified. Emperatriz Daub is surgically absent. No intra-or extrahepatic biliary ductal dilation.  Normal symmetric enhancement of both    kidneys. Stable right hydroureteronephrosis to the level of the postoperative changes in the pelvis. Stable benign left renal parapelvic cysts.  The adrenal glands, spleen and pancreas are normal.  Non-aneurysmal abdominal aorta.  No free intraperitoneal    air.       PELVIS: No dilated loops of bowel, bowel wall thickening or pneumatosis.  Patient status post colectomy with left lower quadrant colostomy. Stable nonobstructing parastomal hernia. No abdominal or pelvic lymphadenopathy. Hypertrophic degenerative changes    of the spine and pelvis. No free pelvic fluid.       1. No evidence of recurrent or metastatic disease   2. Stable left lower quadrant peristomal hernia   3. Stable postoperative changes deep pelvis    4.  Stable right hydroureteronephrosis         Lab Results   Component Value Date     02/27/2018    K 3.8 02/27/2018     02/27/2018    CO2 26 02/27/2018    BUN 21 02/27/2018    CREATININE 0.99 02/27/2018    GLUCOSE 96 02/27/2018    CALCIUM 9.5 02/27/2018     Lab Results   Component Value Date    WBC 9.4 02/27/2018    HGB 13.4 02/27/2018    HCT 41.3 02/27/2018    MCV 79.7 02/27/2018     02/27/2018     Lab Results

## 2018-03-08 ENCOUNTER — HOSPITAL ENCOUNTER (OUTPATIENT)
Age: 68
Discharge: HOME | End: 2018-03-08
Payer: MEDICARE

## 2018-03-08 LAB
-: ABNORMAL
ALBUMIN SERPL-MCNC: 3.9 G/DL (ref 3.5–5.2)
AMORPHOUS: ABNORMAL
ANION GAP SERPL CALCULATED.3IONS-SCNC: 12 MMOL/L (ref 9–17)
BACTERIA: ABNORMAL
BILIRUBIN URINE: NEGATIVE
BUN BLDV-MCNC: 20 MG/DL (ref 8–23)
BUN/CREAT BLD: 18 (ref 9–20)
CALCIUM SERPL-MCNC: 9.1 MG/DL (ref 8.6–10.4)
CASTS UA: ABNORMAL /LPF
CHLORIDE BLD-SCNC: 101 MMOL/L (ref 98–107)
CO2: 27 MMOL/L (ref 20–31)
COLOR: YELLOW
COMMENT UA: ABNORMAL
CREAT SERPL-MCNC: 1.09 MG/DL (ref 0.5–0.9)
CREATININE URINE: 168.2 MG/DL (ref 28–217)
CRYSTALS, UA: ABNORMAL /HPF
EPITHELIAL CELLS UA: ABNORMAL /HPF (ref 0–25)
GFR AFRICAN AMERICAN: >60 ML/MIN
GFR NON-AFRICAN AMERICAN: 50 ML/MIN
GFR SERPL CREATININE-BSD FRML MDRD: ABNORMAL ML/MIN/{1.73_M2}
GFR SERPL CREATININE-BSD FRML MDRD: ABNORMAL ML/MIN/{1.73_M2}
GLUCOSE BLD-MCNC: 91 MG/DL (ref 70–99)
GLUCOSE URINE: NEGATIVE
HCT VFR BLD CALC: 45.5 % (ref 36.3–47.1)
HEMOGLOBIN: 14 G/DL (ref 11.9–15.1)
KETONES, URINE: NEGATIVE
LEUKOCYTE ESTERASE, URINE: NEGATIVE
MAGNESIUM: 2.1 MG/DL (ref 1.6–2.6)
MCH RBC QN AUTO: 25.1 PG (ref 25.2–33.5)
MCHC RBC AUTO-ENTMCNC: 30.8 G/DL (ref 28.4–34.8)
MCV RBC AUTO: 81.5 FL (ref 82.6–102.9)
MUCUS: ABNORMAL
NITRITE, URINE: NEGATIVE
NRBC AUTOMATED: 0 PER 100 WBC
OTHER OBSERVATIONS UA: ABNORMAL
PDW BLD-RTO: 14.5 % (ref 11.8–14.4)
PH UA: 5.5 (ref 5–9)
PHOSPHORUS: 3.1 MG/DL (ref 2.6–4.5)
PLATELET # BLD: 164 K/UL (ref 138–453)
PMV BLD AUTO: 10.3 FL (ref 8.1–13.5)
POTASSIUM SERPL-SCNC: 4.2 MMOL/L (ref 3.7–5.3)
PROTEIN UA: NEGATIVE
PTH INTACT: 101.6 PG/ML (ref 15–65)
RBC # BLD: 5.58 M/UL (ref 3.95–5.11)
RBC UA: ABNORMAL /HPF (ref 0–2)
RENAL EPITHELIAL, UA: ABNORMAL /HPF
SODIUM BLD-SCNC: 140 MMOL/L (ref 135–144)
SPECIFIC GRAVITY UA: 1.02 (ref 1.01–1.02)
TOTAL PROTEIN, URINE: 10 MG/DL
TRICHOMONAS: ABNORMAL
TURBIDITY: ABNORMAL
URIC ACID: 7.3 MG/DL (ref 2.4–5.7)
URINE HGB: ABNORMAL
UROBILINOGEN, URINE: NORMAL
WBC # BLD: 9.3 K/UL (ref 3.5–11.3)
WBC UA: ABNORMAL /HPF (ref 0–5)
YEAST: ABNORMAL

## 2018-03-08 PROCEDURE — 81001 URINALYSIS AUTO W/SCOPE: CPT

## 2018-03-08 PROCEDURE — 82570 ASSAY OF URINE CREATININE: CPT

## 2018-03-08 PROCEDURE — 84156 ASSAY OF PROTEIN URINE: CPT

## 2018-03-08 PROCEDURE — 80069 RENAL FUNCTION PANEL: CPT

## 2018-03-08 PROCEDURE — 83970 ASSAY OF PARATHORMONE: CPT

## 2018-03-08 PROCEDURE — 83735 ASSAY OF MAGNESIUM: CPT

## 2018-03-08 PROCEDURE — 36415 COLL VENOUS BLD VENIPUNCTURE: CPT

## 2018-03-08 PROCEDURE — 84550 ASSAY OF BLOOD/URIC ACID: CPT

## 2018-03-08 PROCEDURE — 85027 COMPLETE CBC AUTOMATED: CPT

## 2018-03-09 ENCOUNTER — OFFICE VISIT (OUTPATIENT)
Dept: PRIMARY CARE CLINIC | Age: 68
End: 2018-03-09
Payer: MEDICARE

## 2018-03-09 VITALS
TEMPERATURE: 97.3 F | SYSTOLIC BLOOD PRESSURE: 178 MMHG | HEART RATE: 98 BPM | RESPIRATION RATE: 18 BRPM | DIASTOLIC BLOOD PRESSURE: 94 MMHG | BODY MASS INDEX: 44.9 KG/M2 | WEIGHT: 278.2 LBS

## 2018-03-09 DIAGNOSIS — C20 RECTAL CANCER (HCC): ICD-10-CM

## 2018-03-09 DIAGNOSIS — Z93.3 COLOSTOMY IN PLACE (HCC): ICD-10-CM

## 2018-03-09 DIAGNOSIS — F41.9 ANXIETY: Primary | ICD-10-CM

## 2018-03-09 PROCEDURE — G8427 DOCREV CUR MEDS BY ELIG CLIN: HCPCS | Performed by: NURSE PRACTITIONER

## 2018-03-09 PROCEDURE — G8482 FLU IMMUNIZE ORDER/ADMIN: HCPCS | Performed by: NURSE PRACTITIONER

## 2018-03-09 PROCEDURE — 3017F COLORECTAL CA SCREEN DOC REV: CPT | Performed by: NURSE PRACTITIONER

## 2018-03-09 PROCEDURE — G8399 PT W/DXA RESULTS DOCUMENT: HCPCS | Performed by: NURSE PRACTITIONER

## 2018-03-09 PROCEDURE — 4040F PNEUMOC VAC/ADMIN/RCVD: CPT | Performed by: NURSE PRACTITIONER

## 2018-03-09 PROCEDURE — 3014F SCREEN MAMMO DOC REV: CPT | Performed by: NURSE PRACTITIONER

## 2018-03-09 PROCEDURE — 1123F ACP DISCUSS/DSCN MKR DOCD: CPT | Performed by: NURSE PRACTITIONER

## 2018-03-09 PROCEDURE — 1090F PRES/ABSN URINE INCON ASSESS: CPT | Performed by: NURSE PRACTITIONER

## 2018-03-09 PROCEDURE — G8598 ASA/ANTIPLAT THER USED: HCPCS | Performed by: NURSE PRACTITIONER

## 2018-03-09 PROCEDURE — G8417 CALC BMI ABV UP PARAM F/U: HCPCS | Performed by: NURSE PRACTITIONER

## 2018-03-09 PROCEDURE — 1036F TOBACCO NON-USER: CPT | Performed by: NURSE PRACTITIONER

## 2018-03-09 PROCEDURE — 99214 OFFICE O/P EST MOD 30 MIN: CPT | Performed by: NURSE PRACTITIONER

## 2018-03-09 RX ORDER — OSTOMY SUPPLY 2 1/4"
EACH MISCELLANEOUS
Qty: 20 EACH | Refills: 5 | Status: SHIPPED | OUTPATIENT
Start: 2018-03-09

## 2018-03-09 RX ORDER — CITALOPRAM 40 MG/1
40 TABLET ORAL DAILY
Qty: 90 TABLET | Refills: 3 | Status: SHIPPED | OUTPATIENT
Start: 2018-03-09 | End: 2018-04-04 | Stop reason: SDUPTHER

## 2018-03-09 RX ORDER — CITALOPRAM 40 MG/1
40 TABLET ORAL DAILY
Qty: 90 TABLET | Refills: 3 | Status: SHIPPED | OUTPATIENT
Start: 2018-03-09 | End: 2018-10-25 | Stop reason: SDUPTHER

## 2018-03-09 RX ORDER — OSTOMY IRRIGATOR 1 3/4"
EACH MISCELLANEOUS
Qty: 20 BOTTLE | Refills: 5 | Status: SHIPPED | OUTPATIENT
Start: 2018-03-09

## 2018-03-09 ASSESSMENT — ENCOUNTER SYMPTOMS
CONSTIPATION: 0
SORE THROAT: 0
DIARRHEA: 0
COUGH: 0
SHORTNESS OF BREATH: 0
VOMITING: 0
VISUAL CHANGE: 0
WHEEZING: 0
RHINORRHEA: 0
NAUSEA: 0
ABDOMINAL PAIN: 0

## 2018-03-09 NOTE — PROGRESS NOTES
SURGICAL HISTORY  08-    Port removal     SHOULDER SURGERY      Left    TUBAL LIGATION      TUNNELED VENOUS PORT PLACEMENT  2013        Medications:       Prior to Admission medications    Medication Sig Start Date End Date Taking? Authorizing Provider   citalopram (CELEXA) 40 MG tablet Take 1 tablet by mouth daily 3/9/18  Yes Diallo Inches Might, CNP   citalopram (CELEXA) 40 MG tablet Take 1 tablet by mouth daily 3/9/18  Yes Diallo Inches Might, CNP   Ostomy Supplies (MIGDALIA-FIT NATURA DRAINABLE POUCH) Pouch MISC USE AS DIRECTED 3/9/18  Yes Diallo Inches Might, CNP   Ostomy Supplies (MIGDALIA-FIT NATURA DURAHESIVE 45MM) MISC USE AS DIRECTED 3/9/18  Yes Diallo Inches Might, CNP   Ostomy Supplies (BRAVA MOLDABLE RING) MISC USE AS DIRECTED 3/9/18  Yes Diallo Inches Might, CNP   meloxicam (MOBIC) 7.5 MG tablet Take 1 tablet by mouth daily 2/1/18  Yes Diallo Inches Might, CNP   atorvastatin (LIPITOR) 40 MG tablet Take 1 tablet by mouth daily 10/31/17  Yes Diallo Inches Might, CNP   albuterol sulfate HFA (VENTOLIN HFA) 108 (90 Base) MCG/ACT inhaler Inhale 2 puffs into the lungs every 6 hours as needed for Wheezing 10/31/17  Yes Diallo Inches Might, CNP   traMADol (ULTRAM) 50 MG tablet Take 1 tablet by mouth 2 times daily as needed for Pain 10/31/17  Yes Diallo Inches Might, CNP   fluticasone-salmeterol (ADVAIR HFA) 115-21 MCG/ACT inhaler Inhale 1 puff into the lungs 2 times daily 10/31/17  Yes Diallo Inches Might, CNP   hydrochlorothiazide (MICROZIDE) 12.5 MG capsule Take 1 capsule by mouth daily 5/3/17  Yes Diallo Inches Might, CNP   losartan (COZAAR) 100 MG tablet Take 1 tablet by mouth daily 5/3/17  Yes Diallo Inches Might, CNP   clopidogrel (PLAVIX) 75 MG tablet Take 1 tablet by mouth daily 5/3/17  Yes Diallo Inches Might, CNP   LORazepam (ATIVAN) 0.5 MG tablet Take 1 tablet by mouth 2 times daily as needed for Anxiety 10/24/16  Yes Diallo Inches Might, CNP   nystatin (MYCOSTATIN) 869309 UNIT/GM powder Apply topically 4 times daily.  6/14/16  Yes Diallo Inches Might, CNP   Cholecalciferol (VITAMIN D) is hyperactive. Physical Exam:   Vitals:  BP (!) 178/94 (Site: Left Arm, Position: Sitting, Cuff Size: Large Adult)   Pulse 98   Temp 97.3 °F (36.3 °C) (Temporal)   Resp 18   Wt 278 lb 3.2 oz (126.2 kg)   BMI 44.90 kg/m²     Physical Exam   Constitutional: She is oriented to person, place, and time. She appears well-developed and well-nourished. No distress. HENT:   Mouth/Throat: Oropharynx is clear and moist.   Eyes: Conjunctivae are normal. No scleral icterus. Neck: Normal range of motion. Neck supple. Cardiovascular: Normal rate and regular rhythm. Pulmonary/Chest: Effort normal and breath sounds normal.   Abdominal: Soft. Bowel sounds are normal. She exhibits no distension. There is no tenderness. Colostomy in place, no sign of skin breakdown or irritation   Musculoskeletal: She exhibits no edema. Neurological: She is alert and oriented to person, place, and time. Skin: Skin is warm and dry. Psychiatric: Her speech is normal. Judgment and thought content normal. Her mood appears anxious. She is agitated. Cognition and memory are normal. She does not exhibit a depressed mood. She expresses no homicidal and no suicidal ideation. She expresses no suicidal plans and no homicidal plans. Nursing note and vitals reviewed.       Data:     Lab Results   Component Value Date     03/08/2018    K 4.2 03/08/2018     03/08/2018    CO2 27 03/08/2018    BUN 20 03/08/2018    CREATININE 1.09 03/08/2018    GLUCOSE 91 03/08/2018    PROT 6.9 02/27/2018    LABALBU 3.9 03/08/2018    BILITOT 0.30 02/27/2018    ALKPHOS 117 02/27/2018    AST 19 02/27/2018    ALT 17 02/27/2018     Lab Results   Component Value Date    WBC 9.3 03/08/2018    RBC 5.58 03/08/2018    HGB 14.0 03/08/2018    HCT 45.5 03/08/2018    MCV 81.5 03/08/2018    MCH 25.1 03/08/2018    MCHC 30.8 03/08/2018    RDW 14.5 03/08/2018     03/08/2018    MPV 10.3 03/08/2018     Lab Results   Component Value Date    TSH 2.52 in place [facility])  Last edited 03/09/18 15:25 EST by Danielle Alejandre, CNP

## 2018-03-21 ENCOUNTER — HOSPITAL ENCOUNTER (OUTPATIENT)
Dept: PET IMAGING | Age: 68
Discharge: HOME | End: 2018-03-23
Payer: MEDICAID

## 2018-03-21 DIAGNOSIS — C20 RECTAL CANCER (HCC): ICD-10-CM

## 2018-03-21 DIAGNOSIS — R97.0 ELEVATED CEA: ICD-10-CM

## 2018-03-21 PROCEDURE — 78815 PET IMAGE W/CT SKULL-THIGH: CPT

## 2018-03-21 PROCEDURE — 3430000000 HC RX DIAGNOSTIC RADIOPHARMACEUTICAL: Performed by: INTERNAL MEDICINE

## 2018-03-21 PROCEDURE — A9552 F18 FDG: HCPCS | Performed by: INTERNAL MEDICINE

## 2018-03-21 RX ORDER — FLUDEOXYGLUCOSE F 18 200 MCI/ML
15.92 INJECTION, SOLUTION INTRAVENOUS
Status: COMPLETED | OUTPATIENT
Start: 2018-03-21 | End: 2018-03-21

## 2018-03-21 RX ADMIN — FLUDEOXYGLUCOSE F 18 15.92 MILLICURIE: 200 INJECTION, SOLUTION INTRAVENOUS at 08:30

## 2018-03-28 ENCOUNTER — OFFICE VISIT (OUTPATIENT)
Dept: ONCOLOGY | Age: 68
End: 2018-03-28
Payer: MEDICAID

## 2018-03-28 ENCOUNTER — TELEPHONE (OUTPATIENT)
Dept: GASTROENTEROLOGY | Age: 68
End: 2018-03-28

## 2018-03-28 VITALS
WEIGHT: 276.8 LBS | DIASTOLIC BLOOD PRESSURE: 82 MMHG | BODY MASS INDEX: 44.48 KG/M2 | TEMPERATURE: 97.7 F | SYSTOLIC BLOOD PRESSURE: 141 MMHG | HEIGHT: 66 IN | HEART RATE: 84 BPM | RESPIRATION RATE: 18 BRPM

## 2018-03-28 DIAGNOSIS — C20 RECTAL CANCER (HCC): Primary | ICD-10-CM

## 2018-03-28 DIAGNOSIS — Z85.048 HISTORY OF RECTAL CANCER: Primary | ICD-10-CM

## 2018-03-28 DIAGNOSIS — R94.8 ABNORMAL POSITRON EMISSION TOMOGRAPHY (PET) SCAN: ICD-10-CM

## 2018-03-28 PROCEDURE — G8598 ASA/ANTIPLAT THER USED: HCPCS | Performed by: INTERNAL MEDICINE

## 2018-03-28 PROCEDURE — 1036F TOBACCO NON-USER: CPT | Performed by: INTERNAL MEDICINE

## 2018-03-28 PROCEDURE — G8427 DOCREV CUR MEDS BY ELIG CLIN: HCPCS | Performed by: INTERNAL MEDICINE

## 2018-03-28 PROCEDURE — 3014F SCREEN MAMMO DOC REV: CPT | Performed by: INTERNAL MEDICINE

## 2018-03-28 PROCEDURE — 99214 OFFICE O/P EST MOD 30 MIN: CPT | Performed by: INTERNAL MEDICINE

## 2018-03-28 PROCEDURE — 3017F COLORECTAL CA SCREEN DOC REV: CPT | Performed by: INTERNAL MEDICINE

## 2018-03-28 PROCEDURE — 4040F PNEUMOC VAC/ADMIN/RCVD: CPT | Performed by: INTERNAL MEDICINE

## 2018-03-28 PROCEDURE — 1090F PRES/ABSN URINE INCON ASSESS: CPT | Performed by: INTERNAL MEDICINE

## 2018-03-28 PROCEDURE — G8399 PT W/DXA RESULTS DOCUMENT: HCPCS | Performed by: INTERNAL MEDICINE

## 2018-03-28 PROCEDURE — G8417 CALC BMI ABV UP PARAM F/U: HCPCS | Performed by: INTERNAL MEDICINE

## 2018-03-28 PROCEDURE — 1123F ACP DISCUSS/DSCN MKR DOCD: CPT | Performed by: INTERNAL MEDICINE

## 2018-03-28 PROCEDURE — G8482 FLU IMMUNIZE ORDER/ADMIN: HCPCS | Performed by: INTERNAL MEDICINE

## 2018-03-30 PROBLEM — R94.8 ABNORMAL POSITRON EMISSION TOMOGRAPHY (PET) SCAN: Status: ACTIVE | Noted: 2018-03-30

## 2018-03-30 RX ORDER — SODIUM, POTASSIUM,MAG SULFATES 17.5-3.13G
SOLUTION, RECONSTITUTED, ORAL ORAL
Qty: 2 BOTTLE | Refills: 0 | Status: ON HOLD | OUTPATIENT
Start: 2018-03-30 | End: 2018-04-16 | Stop reason: HOSPADM

## 2018-03-30 NOTE — PROGRESS NOTES
MD Butch                          [facility]                          Cell: [phone]

## 2018-04-04 ENCOUNTER — OFFICE VISIT (OUTPATIENT)
Dept: UROLOGY | Age: 68
End: 2018-04-04
Payer: MEDICAID

## 2018-04-04 VITALS
BODY MASS INDEX: 45.8 KG/M2 | DIASTOLIC BLOOD PRESSURE: 80 MMHG | HEIGHT: 66 IN | SYSTOLIC BLOOD PRESSURE: 140 MMHG | WEIGHT: 285 LBS

## 2018-04-04 DIAGNOSIS — R31.29 MICROHEMATURIA: Primary | ICD-10-CM

## 2018-04-04 DIAGNOSIS — N13.30 HYDROURETERONEPHROSIS: ICD-10-CM

## 2018-04-04 PROCEDURE — 3014F SCREEN MAMMO DOC REV: CPT | Performed by: NURSE PRACTITIONER

## 2018-04-04 PROCEDURE — G8399 PT W/DXA RESULTS DOCUMENT: HCPCS | Performed by: NURSE PRACTITIONER

## 2018-04-04 PROCEDURE — 1036F TOBACCO NON-USER: CPT | Performed by: NURSE PRACTITIONER

## 2018-04-04 PROCEDURE — 99213 OFFICE O/P EST LOW 20 MIN: CPT | Performed by: NURSE PRACTITIONER

## 2018-04-04 PROCEDURE — G8598 ASA/ANTIPLAT THER USED: HCPCS | Performed by: NURSE PRACTITIONER

## 2018-04-04 PROCEDURE — 3017F COLORECTAL CA SCREEN DOC REV: CPT | Performed by: NURSE PRACTITIONER

## 2018-04-04 PROCEDURE — 1090F PRES/ABSN URINE INCON ASSESS: CPT | Performed by: NURSE PRACTITIONER

## 2018-04-04 PROCEDURE — 4040F PNEUMOC VAC/ADMIN/RCVD: CPT | Performed by: NURSE PRACTITIONER

## 2018-04-04 PROCEDURE — G8427 DOCREV CUR MEDS BY ELIG CLIN: HCPCS | Performed by: NURSE PRACTITIONER

## 2018-04-04 PROCEDURE — 1123F ACP DISCUSS/DSCN MKR DOCD: CPT | Performed by: NURSE PRACTITIONER

## 2018-04-04 PROCEDURE — G8417 CALC BMI ABV UP PARAM F/U: HCPCS | Performed by: NURSE PRACTITIONER

## 2018-04-04 ASSESSMENT — ENCOUNTER SYMPTOMS
EYE PAIN: 0
BACK PAIN: 0
SHORTNESS OF BREATH: 0
WHEEZING: 0
EYE REDNESS: 0
CONSTIPATION: 0
COLOR CHANGE: 0
COUGH: 0
NAUSEA: 0
VOMITING: 0
ABDOMINAL PAIN: 0

## 2018-04-13 ENCOUNTER — HOSPITAL ENCOUNTER (OUTPATIENT)
Dept: CT IMAGING | Age: 68
Discharge: HOME | End: 2018-04-15
Payer: MEDICAID

## 2018-04-13 VITALS
OXYGEN SATURATION: 96 % | SYSTOLIC BLOOD PRESSURE: 135 MMHG | DIASTOLIC BLOOD PRESSURE: 54 MMHG | HEART RATE: 83 BPM | RESPIRATION RATE: 16 BRPM

## 2018-04-13 DIAGNOSIS — C20 RECTAL CANCER (HCC): ICD-10-CM

## 2018-04-13 PROCEDURE — 38505 NEEDLE BIOPSY LYMPH NODES: CPT

## 2018-04-13 PROCEDURE — 2500000003 HC RX 250 WO HCPCS: Performed by: RADIOLOGY

## 2018-04-13 PROCEDURE — 77012 CT SCAN FOR NEEDLE BIOPSY: CPT

## 2018-04-13 PROCEDURE — 88305 TISSUE EXAM BY PATHOLOGIST: CPT

## 2018-04-13 RX ORDER — LIDOCAINE HYDROCHLORIDE 20 MG/ML
INJECTION, SOLUTION INFILTRATION; PERINEURAL
Status: COMPLETED | OUTPATIENT
Start: 2018-04-13 | End: 2018-04-13

## 2018-04-13 RX ADMIN — LIDOCAINE HYDROCHLORIDE 8 ML: 20 INJECTION, SOLUTION INFILTRATION; PERINEURAL at 09:47

## 2018-04-13 RX ADMIN — LIDOCAINE HYDROCHLORIDE 2 ML: 20 INJECTION, SOLUTION INFILTRATION; PERINEURAL at 09:55

## 2018-04-13 RX ADMIN — LIDOCAINE HYDROCHLORIDE 3 ML: 20 INJECTION, SOLUTION INFILTRATION; PERINEURAL at 09:51

## 2018-04-16 ENCOUNTER — ANESTHESIA (OUTPATIENT)
Dept: OPERATING ROOM | Age: 68
End: 2018-04-16
Payer: MEDICAID

## 2018-04-16 ENCOUNTER — HOSPITAL ENCOUNTER (OUTPATIENT)
Age: 68
Setting detail: OUTPATIENT SURGERY
Discharge: HOME | End: 2018-04-16
Attending: INTERNAL MEDICINE | Admitting: INTERNAL MEDICINE
Payer: MEDICAID

## 2018-04-16 ENCOUNTER — ANESTHESIA EVENT (OUTPATIENT)
Dept: OPERATING ROOM | Age: 68
End: 2018-04-16
Payer: MEDICAID

## 2018-04-16 VITALS
RESPIRATION RATE: 15 BRPM | SYSTOLIC BLOOD PRESSURE: 116 MMHG | OXYGEN SATURATION: 97 % | DIASTOLIC BLOOD PRESSURE: 59 MMHG

## 2018-04-16 VITALS
WEIGHT: 275 LBS | DIASTOLIC BLOOD PRESSURE: 71 MMHG | RESPIRATION RATE: 16 BRPM | TEMPERATURE: 97.2 F | HEART RATE: 77 BPM | OXYGEN SATURATION: 97 % | HEIGHT: 66 IN | SYSTOLIC BLOOD PRESSURE: 128 MMHG | BODY MASS INDEX: 44.2 KG/M2

## 2018-04-16 LAB — SURGICAL PATHOLOGY REPORT: NORMAL

## 2018-04-16 PROCEDURE — 7100000010 HC PHASE II RECOVERY - FIRST 15 MIN: Performed by: INTERNAL MEDICINE

## 2018-04-16 PROCEDURE — 2500000003 HC RX 250 WO HCPCS: Performed by: NURSE ANESTHETIST, CERTIFIED REGISTERED

## 2018-04-16 PROCEDURE — 3700000001 HC ADD 15 MINUTES (ANESTHESIA): Performed by: INTERNAL MEDICINE

## 2018-04-16 PROCEDURE — 45378 DIAGNOSTIC COLONOSCOPY: CPT | Performed by: INTERNAL MEDICINE

## 2018-04-16 PROCEDURE — 3700000000 HC ANESTHESIA ATTENDED CARE: Performed by: INTERNAL MEDICINE

## 2018-04-16 PROCEDURE — 6360000002 HC RX W HCPCS: Performed by: NURSE ANESTHETIST, CERTIFIED REGISTERED

## 2018-04-16 PROCEDURE — 3609027000 HC COLONOSCOPY: Performed by: INTERNAL MEDICINE

## 2018-04-16 PROCEDURE — 2580000003 HC RX 258: Performed by: INTERNAL MEDICINE

## 2018-04-16 PROCEDURE — 7100000011 HC PHASE II RECOVERY - ADDTL 15 MIN: Performed by: INTERNAL MEDICINE

## 2018-04-16 RX ORDER — LIDOCAINE HYDROCHLORIDE 20 MG/ML
INJECTION, SOLUTION INFILTRATION; PERINEURAL PRN
Status: DISCONTINUED | OUTPATIENT
Start: 2018-04-16 | End: 2018-04-16 | Stop reason: SDUPTHER

## 2018-04-16 RX ORDER — PROPOFOL 10 MG/ML
INJECTION, EMULSION INTRAVENOUS PRN
Status: DISCONTINUED | OUTPATIENT
Start: 2018-04-16 | End: 2018-04-16 | Stop reason: SDUPTHER

## 2018-04-16 RX ORDER — SODIUM CHLORIDE, SODIUM LACTATE, POTASSIUM CHLORIDE, CALCIUM CHLORIDE 600; 310; 30; 20 MG/100ML; MG/100ML; MG/100ML; MG/100ML
INJECTION, SOLUTION INTRAVENOUS CONTINUOUS
Status: DISCONTINUED | OUTPATIENT
Start: 2018-04-16 | End: 2018-04-16 | Stop reason: HOSPADM

## 2018-04-16 RX ADMIN — LIDOCAINE HYDROCHLORIDE 80 MG: 20 INJECTION, SOLUTION INFILTRATION; PERINEURAL at 14:22

## 2018-04-16 RX ADMIN — PROPOFOL 400 MG: 10 INJECTION, EMULSION INTRAVENOUS at 14:22

## 2018-04-16 RX ADMIN — SODIUM CHLORIDE, POTASSIUM CHLORIDE, SODIUM LACTATE AND CALCIUM CHLORIDE: 600; 310; 30; 20 INJECTION, SOLUTION INTRAVENOUS at 13:19

## 2018-04-16 ASSESSMENT — PAIN SCALES - GENERAL
PAINLEVEL_OUTOF10: 0

## 2018-04-16 ASSESSMENT — LIFESTYLE VARIABLES: SMOKING_STATUS: 0

## 2018-04-20 ENCOUNTER — OFFICE VISIT (OUTPATIENT)
Dept: PRIMARY CARE CLINIC | Age: 68
End: 2018-04-20
Payer: MEDICAID

## 2018-04-20 ENCOUNTER — HOSPITAL ENCOUNTER (OUTPATIENT)
Age: 68
Discharge: HOME | End: 2018-04-22
Payer: MEDICAID

## 2018-04-20 ENCOUNTER — TELEPHONE (OUTPATIENT)
Dept: PRIMARY CARE CLINIC | Age: 68
End: 2018-04-20

## 2018-04-20 ENCOUNTER — HOSPITAL ENCOUNTER (OUTPATIENT)
Dept: GENERAL RADIOLOGY | Age: 68
Discharge: HOME | End: 2018-04-22
Payer: MEDICAID

## 2018-04-20 ENCOUNTER — OFFICE VISIT (OUTPATIENT)
Dept: ONCOLOGY | Age: 68
End: 2018-04-20
Payer: MEDICAID

## 2018-04-20 VITALS
DIASTOLIC BLOOD PRESSURE: 80 MMHG | RESPIRATION RATE: 18 BRPM | TEMPERATURE: 98.3 F | BODY MASS INDEX: 44.39 KG/M2 | WEIGHT: 275 LBS | HEART RATE: 86 BPM | SYSTOLIC BLOOD PRESSURE: 180 MMHG

## 2018-04-20 VITALS
HEART RATE: 79 BPM | HEIGHT: 66 IN | TEMPERATURE: 98.4 F | BODY MASS INDEX: 44.87 KG/M2 | WEIGHT: 279.2 LBS | RESPIRATION RATE: 18 BRPM | SYSTOLIC BLOOD PRESSURE: 159 MMHG | DIASTOLIC BLOOD PRESSURE: 76 MMHG

## 2018-04-20 DIAGNOSIS — M25.561 ANTERIOR KNEE PAIN, RIGHT: ICD-10-CM

## 2018-04-20 DIAGNOSIS — R26.81 GAIT INSTABILITY: ICD-10-CM

## 2018-04-20 DIAGNOSIS — C20 RECTAL CANCER (HCC): Primary | ICD-10-CM

## 2018-04-20 DIAGNOSIS — S83.91XA SPRAIN OF RIGHT KNEE, UNSPECIFIED LIGAMENT, INITIAL ENCOUNTER: ICD-10-CM

## 2018-04-20 DIAGNOSIS — R97.0 ELEVATED CEA: ICD-10-CM

## 2018-04-20 DIAGNOSIS — S83.91XA SPRAIN OF RIGHT KNEE, UNSPECIFIED LIGAMENT, INITIAL ENCOUNTER: Primary | ICD-10-CM

## 2018-04-20 PROCEDURE — 4040F PNEUMOC VAC/ADMIN/RCVD: CPT | Performed by: INTERNAL MEDICINE

## 2018-04-20 PROCEDURE — 1123F ACP DISCUSS/DSCN MKR DOCD: CPT | Performed by: INTERNAL MEDICINE

## 2018-04-20 PROCEDURE — G8598 ASA/ANTIPLAT THER USED: HCPCS | Performed by: INTERNAL MEDICINE

## 2018-04-20 PROCEDURE — 99213 OFFICE O/P EST LOW 20 MIN: CPT | Performed by: NURSE PRACTITIONER

## 2018-04-20 PROCEDURE — G8417 CALC BMI ABV UP PARAM F/U: HCPCS | Performed by: INTERNAL MEDICINE

## 2018-04-20 PROCEDURE — 1036F TOBACCO NON-USER: CPT | Performed by: INTERNAL MEDICINE

## 2018-04-20 PROCEDURE — 3017F COLORECTAL CA SCREEN DOC REV: CPT | Performed by: NURSE PRACTITIONER

## 2018-04-20 PROCEDURE — 1036F TOBACCO NON-USER: CPT | Performed by: NURSE PRACTITIONER

## 2018-04-20 PROCEDURE — 1090F PRES/ABSN URINE INCON ASSESS: CPT | Performed by: INTERNAL MEDICINE

## 2018-04-20 PROCEDURE — 99214 OFFICE O/P EST MOD 30 MIN: CPT | Performed by: INTERNAL MEDICINE

## 2018-04-20 PROCEDURE — 4040F PNEUMOC VAC/ADMIN/RCVD: CPT | Performed by: NURSE PRACTITIONER

## 2018-04-20 PROCEDURE — G8598 ASA/ANTIPLAT THER USED: HCPCS | Performed by: NURSE PRACTITIONER

## 2018-04-20 PROCEDURE — 3014F SCREEN MAMMO DOC REV: CPT | Performed by: INTERNAL MEDICINE

## 2018-04-20 PROCEDURE — G8427 DOCREV CUR MEDS BY ELIG CLIN: HCPCS | Performed by: NURSE PRACTITIONER

## 2018-04-20 PROCEDURE — 73562 X-RAY EXAM OF KNEE 3: CPT

## 2018-04-20 PROCEDURE — 1123F ACP DISCUSS/DSCN MKR DOCD: CPT | Performed by: NURSE PRACTITIONER

## 2018-04-20 PROCEDURE — G8427 DOCREV CUR MEDS BY ELIG CLIN: HCPCS | Performed by: INTERNAL MEDICINE

## 2018-04-20 PROCEDURE — G8399 PT W/DXA RESULTS DOCUMENT: HCPCS | Performed by: NURSE PRACTITIONER

## 2018-04-20 PROCEDURE — 3014F SCREEN MAMMO DOC REV: CPT | Performed by: NURSE PRACTITIONER

## 2018-04-20 PROCEDURE — G8417 CALC BMI ABV UP PARAM F/U: HCPCS | Performed by: NURSE PRACTITIONER

## 2018-04-20 PROCEDURE — G8399 PT W/DXA RESULTS DOCUMENT: HCPCS | Performed by: INTERNAL MEDICINE

## 2018-04-20 PROCEDURE — 1090F PRES/ABSN URINE INCON ASSESS: CPT | Performed by: NURSE PRACTITIONER

## 2018-04-20 PROCEDURE — 3017F COLORECTAL CA SCREEN DOC REV: CPT | Performed by: INTERNAL MEDICINE

## 2018-04-20 RX ORDER — HYDROCODONE BITARTRATE AND ACETAMINOPHEN 5; 325 MG/1; MG/1
1 TABLET ORAL EVERY 6 HOURS PRN
Qty: 12 TABLET | Refills: 0 | Status: SHIPPED | OUTPATIENT
Start: 2018-04-20 | End: 2018-04-26 | Stop reason: SDUPTHER

## 2018-04-20 RX ORDER — HYDROCODONE BITARTRATE AND ACETAMINOPHEN 5; 325 MG/1; MG/1
1 TABLET ORAL EVERY 6 HOURS PRN
Qty: 12 TABLET | Refills: 0 | Status: SHIPPED | OUTPATIENT
Start: 2018-04-20 | End: 2018-04-20 | Stop reason: SDUPTHER

## 2018-04-20 ASSESSMENT — ENCOUNTER SYMPTOMS
SHORTNESS OF BREATH: 0
BACK PAIN: 0
COUGH: 0

## 2018-04-23 ENCOUNTER — TELEPHONE (OUTPATIENT)
Dept: PRIMARY CARE CLINIC | Age: 68
End: 2018-04-23

## 2018-04-23 ENCOUNTER — HOSPITAL ENCOUNTER (OUTPATIENT)
Age: 68
Discharge: HOME | End: 2018-04-23
Payer: MEDICAID

## 2018-04-23 DIAGNOSIS — E78.2 MIXED HYPERLIPIDEMIA: ICD-10-CM

## 2018-04-23 DIAGNOSIS — I10 ESSENTIAL HYPERTENSION: ICD-10-CM

## 2018-04-23 LAB
ABSOLUTE EOS #: 0.23 K/UL (ref 0–0.44)
ABSOLUTE IMMATURE GRANULOCYTE: 0.03 K/UL (ref 0–0.3)
ABSOLUTE LYMPH #: 1.35 K/UL (ref 1.1–3.7)
ABSOLUTE MONO #: 0.58 K/UL (ref 0.1–1.2)
ALBUMIN SERPL-MCNC: 3.8 G/DL (ref 3.5–5.2)
ALBUMIN/GLOBULIN RATIO: 1.3 (ref 1–2.5)
ALP BLD-CCNC: 106 U/L (ref 35–104)
ALT SERPL-CCNC: 12 U/L (ref 5–33)
ANION GAP SERPL CALCULATED.3IONS-SCNC: 11 MMOL/L (ref 9–17)
AST SERPL-CCNC: 16 U/L
BASOPHILS # BLD: 1 % (ref 0–2)
BASOPHILS ABSOLUTE: 0.04 K/UL (ref 0–0.2)
BILIRUB SERPL-MCNC: 0.4 MG/DL (ref 0.3–1.2)
BUN BLDV-MCNC: 17 MG/DL (ref 8–23)
BUN/CREAT BLD: 18 (ref 9–20)
CALCIUM SERPL-MCNC: 9.3 MG/DL (ref 8.6–10.4)
CHLORIDE BLD-SCNC: 103 MMOL/L (ref 98–107)
CHOLESTEROL/HDL RATIO: 3.4
CHOLESTEROL: 148 MG/DL
CO2: 28 MMOL/L (ref 20–31)
CREAT SERPL-MCNC: 0.93 MG/DL (ref 0.5–0.9)
CREATININE URINE: 67.6 MG/DL (ref 28–217)
DIFFERENTIAL TYPE: ABNORMAL
EOSINOPHILS RELATIVE PERCENT: 3 % (ref 1–4)
GFR AFRICAN AMERICAN: >60 ML/MIN
GFR NON-AFRICAN AMERICAN: 60 ML/MIN
GFR SERPL CREATININE-BSD FRML MDRD: ABNORMAL ML/MIN/{1.73_M2}
GFR SERPL CREATININE-BSD FRML MDRD: ABNORMAL ML/MIN/{1.73_M2}
GLUCOSE BLD-MCNC: 110 MG/DL (ref 70–99)
HCT VFR BLD CALC: 42.4 % (ref 36.3–47.1)
HDLC SERPL-MCNC: 43 MG/DL
HEMOGLOBIN: 13.1 G/DL (ref 11.9–15.1)
IMMATURE GRANULOCYTES: 0 %
LDL CHOLESTEROL: 78 MG/DL (ref 0–130)
LYMPHOCYTES # BLD: 17 % (ref 24–43)
MCH RBC QN AUTO: 25.1 PG (ref 25.2–33.5)
MCHC RBC AUTO-ENTMCNC: 30.9 G/DL (ref 28.4–34.8)
MCV RBC AUTO: 81.4 FL (ref 82.6–102.9)
MICROALBUMIN/CREAT 24H UR: <12 MG/L
MICROALBUMIN/CREAT UR-RTO: NORMAL MCG/MG CREAT
MONOCYTES # BLD: 7 % (ref 3–12)
NRBC AUTOMATED: 0 PER 100 WBC
PDW BLD-RTO: 14.3 % (ref 11.8–14.4)
PLATELET # BLD: 225 K/UL (ref 138–453)
PLATELET ESTIMATE: ABNORMAL
PMV BLD AUTO: 10.8 FL (ref 8.1–13.5)
POTASSIUM SERPL-SCNC: 4 MMOL/L (ref 3.7–5.3)
RBC # BLD: 5.21 M/UL (ref 3.95–5.11)
RBC # BLD: ABNORMAL 10*6/UL
SEG NEUTROPHILS: 72 % (ref 36–65)
SEGMENTED NEUTROPHILS ABSOLUTE COUNT: 5.84 K/UL (ref 1.5–8.1)
SODIUM BLD-SCNC: 142 MMOL/L (ref 135–144)
TOTAL PROTEIN: 6.8 G/DL (ref 6.4–8.3)
TRIGL SERPL-MCNC: 133 MG/DL
VLDLC SERPL CALC-MCNC: NORMAL MG/DL (ref 1–30)
WBC # BLD: 8.1 K/UL (ref 3.5–11.3)
WBC # BLD: ABNORMAL 10*3/UL

## 2018-04-23 PROCEDURE — 80061 LIPID PANEL: CPT

## 2018-04-23 PROCEDURE — 82570 ASSAY OF URINE CREATININE: CPT

## 2018-04-23 PROCEDURE — 82043 UR ALBUMIN QUANTITATIVE: CPT

## 2018-04-23 PROCEDURE — 36415 COLL VENOUS BLD VENIPUNCTURE: CPT

## 2018-04-23 PROCEDURE — 80053 COMPREHEN METABOLIC PANEL: CPT

## 2018-04-23 PROCEDURE — 85025 COMPLETE CBC W/AUTO DIFF WBC: CPT

## 2018-04-25 ENCOUNTER — OFFICE VISIT (OUTPATIENT)
Dept: SURGERY | Age: 68
End: 2018-04-25
Payer: MEDICAID

## 2018-04-25 VITALS
DIASTOLIC BLOOD PRESSURE: 70 MMHG | HEART RATE: 91 BPM | TEMPERATURE: 97.3 F | BODY MASS INDEX: 44.74 KG/M2 | WEIGHT: 278.4 LBS | SYSTOLIC BLOOD PRESSURE: 138 MMHG | HEIGHT: 66 IN

## 2018-04-25 DIAGNOSIS — Z93.3 COLOSTOMY IN PLACE (HCC): Primary | ICD-10-CM

## 2018-04-25 DIAGNOSIS — Z85.048 HISTORY OF RECTAL CANCER: ICD-10-CM

## 2018-04-25 DIAGNOSIS — R94.8 ABNORMAL POSITRON EMISSION TOMOGRAPHY (PET) SCAN: ICD-10-CM

## 2018-04-25 DIAGNOSIS — R97.0 ELEVATED CEA: ICD-10-CM

## 2018-04-25 PROCEDURE — 4040F PNEUMOC VAC/ADMIN/RCVD: CPT | Performed by: SURGERY

## 2018-04-25 PROCEDURE — 1036F TOBACCO NON-USER: CPT | Performed by: SURGERY

## 2018-04-25 PROCEDURE — G8399 PT W/DXA RESULTS DOCUMENT: HCPCS | Performed by: SURGERY

## 2018-04-25 PROCEDURE — 3017F COLORECTAL CA SCREEN DOC REV: CPT | Performed by: SURGERY

## 2018-04-25 PROCEDURE — 1090F PRES/ABSN URINE INCON ASSESS: CPT | Performed by: SURGERY

## 2018-04-25 PROCEDURE — G8427 DOCREV CUR MEDS BY ELIG CLIN: HCPCS | Performed by: SURGERY

## 2018-04-25 PROCEDURE — G8417 CALC BMI ABV UP PARAM F/U: HCPCS | Performed by: SURGERY

## 2018-04-25 PROCEDURE — 99213 OFFICE O/P EST LOW 20 MIN: CPT | Performed by: SURGERY

## 2018-04-25 PROCEDURE — 1123F ACP DISCUSS/DSCN MKR DOCD: CPT | Performed by: SURGERY

## 2018-04-25 PROCEDURE — G8598 ASA/ANTIPLAT THER USED: HCPCS | Performed by: SURGERY

## 2018-04-26 ENCOUNTER — OFFICE VISIT (OUTPATIENT)
Dept: PRIMARY CARE CLINIC | Age: 68
End: 2018-04-26
Payer: MEDICAID

## 2018-04-26 VITALS
TEMPERATURE: 98.3 F | WEIGHT: 278.5 LBS | BODY MASS INDEX: 44.97 KG/M2 | RESPIRATION RATE: 20 BRPM | DIASTOLIC BLOOD PRESSURE: 73 MMHG | SYSTOLIC BLOOD PRESSURE: 138 MMHG | HEART RATE: 90 BPM

## 2018-04-26 DIAGNOSIS — G89.29 CHRONIC PAIN OF RIGHT KNEE: ICD-10-CM

## 2018-04-26 DIAGNOSIS — Z23 NEED FOR PNEUMOCOCCAL VACCINATION: ICD-10-CM

## 2018-04-26 DIAGNOSIS — M25.561 CHRONIC PAIN OF RIGHT KNEE: ICD-10-CM

## 2018-04-26 DIAGNOSIS — L20.9 ATOPIC DERMATITIS, UNSPECIFIED TYPE: ICD-10-CM

## 2018-04-26 DIAGNOSIS — I10 ESSENTIAL HYPERTENSION: Primary | ICD-10-CM

## 2018-04-26 DIAGNOSIS — E78.2 MIXED HYPERLIPIDEMIA: ICD-10-CM

## 2018-04-26 PROCEDURE — 4040F PNEUMOC VAC/ADMIN/RCVD: CPT | Performed by: NURSE PRACTITIONER

## 2018-04-26 PROCEDURE — 1123F ACP DISCUSS/DSCN MKR DOCD: CPT | Performed by: NURSE PRACTITIONER

## 2018-04-26 PROCEDURE — G8427 DOCREV CUR MEDS BY ELIG CLIN: HCPCS | Performed by: NURSE PRACTITIONER

## 2018-04-26 PROCEDURE — G8417 CALC BMI ABV UP PARAM F/U: HCPCS | Performed by: NURSE PRACTITIONER

## 2018-04-26 PROCEDURE — G0009 ADMIN PNEUMOCOCCAL VACCINE: HCPCS | Performed by: NURSE PRACTITIONER

## 2018-04-26 PROCEDURE — G8399 PT W/DXA RESULTS DOCUMENT: HCPCS | Performed by: NURSE PRACTITIONER

## 2018-04-26 PROCEDURE — 99214 OFFICE O/P EST MOD 30 MIN: CPT | Performed by: NURSE PRACTITIONER

## 2018-04-26 PROCEDURE — 1036F TOBACCO NON-USER: CPT | Performed by: NURSE PRACTITIONER

## 2018-04-26 PROCEDURE — 1090F PRES/ABSN URINE INCON ASSESS: CPT | Performed by: NURSE PRACTITIONER

## 2018-04-26 PROCEDURE — G8598 ASA/ANTIPLAT THER USED: HCPCS | Performed by: NURSE PRACTITIONER

## 2018-04-26 PROCEDURE — 90732 PPSV23 VACC 2 YRS+ SUBQ/IM: CPT | Performed by: NURSE PRACTITIONER

## 2018-04-26 PROCEDURE — 3017F COLORECTAL CA SCREEN DOC REV: CPT | Performed by: NURSE PRACTITIONER

## 2018-04-26 RX ORDER — CLOPIDOGREL BISULFATE 75 MG/1
75 TABLET ORAL DAILY
Qty: 90 TABLET | Refills: 3 | Status: SHIPPED | OUTPATIENT
Start: 2018-04-26

## 2018-04-26 RX ORDER — HYDROCODONE BITARTRATE AND ACETAMINOPHEN 5; 325 MG/1; MG/1
1 TABLET ORAL EVERY 6 HOURS PRN
Qty: 12 TABLET | Refills: 0 | Status: SHIPPED | OUTPATIENT
Start: 2018-04-26 | End: 2018-04-29

## 2018-04-26 RX ORDER — HYDROCHLOROTHIAZIDE 12.5 MG/1
12.5 CAPSULE, GELATIN COATED ORAL DAILY
Qty: 90 CAPSULE | Refills: 3 | Status: SHIPPED | OUTPATIENT
Start: 2018-04-26 | End: 2018-10-25 | Stop reason: SDUPTHER

## 2018-04-26 RX ORDER — LOSARTAN POTASSIUM 100 MG/1
100 TABLET ORAL DAILY
Qty: 90 TABLET | Refills: 3 | Status: SHIPPED | OUTPATIENT
Start: 2018-04-26 | End: 2018-10-25 | Stop reason: SDUPTHER

## 2018-04-26 ASSESSMENT — ENCOUNTER SYMPTOMS
VOMITING: 0
SHORTNESS OF BREATH: 0
DIARRHEA: 0
RHINORRHEA: 0
COUGH: 0
NAUSEA: 0
CONSTIPATION: 0
WHEEZING: 0
SORE THROAT: 0
ABDOMINAL PAIN: 0

## 2018-05-03 RX ORDER — ALBUTEROL SULFATE 90 UG/1
2 AEROSOL, METERED RESPIRATORY (INHALATION) EVERY 6 HOURS PRN
Qty: 1 INHALER | Refills: 3 | Status: SHIPPED | OUTPATIENT
Start: 2018-05-03

## 2018-05-16 ENCOUNTER — OFFICE VISIT (OUTPATIENT)
Dept: ONCOLOGY | Age: 68
End: 2018-05-16
Payer: MEDICAID

## 2018-05-16 VITALS
SYSTOLIC BLOOD PRESSURE: 165 MMHG | WEIGHT: 283 LBS | DIASTOLIC BLOOD PRESSURE: 80 MMHG | HEIGHT: 65 IN | TEMPERATURE: 97.7 F | BODY MASS INDEX: 47.15 KG/M2 | HEART RATE: 75 BPM | RESPIRATION RATE: 18 BRPM

## 2018-05-16 DIAGNOSIS — C20 RECTAL CANCER (HCC): Primary | ICD-10-CM

## 2018-05-16 PROCEDURE — 1123F ACP DISCUSS/DSCN MKR DOCD: CPT | Performed by: INTERNAL MEDICINE

## 2018-05-16 PROCEDURE — G8399 PT W/DXA RESULTS DOCUMENT: HCPCS | Performed by: INTERNAL MEDICINE

## 2018-05-16 PROCEDURE — 99214 OFFICE O/P EST MOD 30 MIN: CPT | Performed by: INTERNAL MEDICINE

## 2018-05-16 PROCEDURE — 1036F TOBACCO NON-USER: CPT | Performed by: INTERNAL MEDICINE

## 2018-05-16 PROCEDURE — G8417 CALC BMI ABV UP PARAM F/U: HCPCS | Performed by: INTERNAL MEDICINE

## 2018-05-16 PROCEDURE — G8598 ASA/ANTIPLAT THER USED: HCPCS | Performed by: INTERNAL MEDICINE

## 2018-05-16 PROCEDURE — 1090F PRES/ABSN URINE INCON ASSESS: CPT | Performed by: INTERNAL MEDICINE

## 2018-05-16 PROCEDURE — 3017F COLORECTAL CA SCREEN DOC REV: CPT | Performed by: INTERNAL MEDICINE

## 2018-05-16 PROCEDURE — 4040F PNEUMOC VAC/ADMIN/RCVD: CPT | Performed by: INTERNAL MEDICINE

## 2018-05-16 PROCEDURE — G8427 DOCREV CUR MEDS BY ELIG CLIN: HCPCS | Performed by: INTERNAL MEDICINE

## 2018-06-29 ENCOUNTER — OFFICE VISIT (OUTPATIENT)
Dept: PRIMARY CARE CLINIC | Age: 68
End: 2018-06-29
Payer: MEDICAID

## 2018-06-29 VITALS
RESPIRATION RATE: 18 BRPM | BODY MASS INDEX: 46.89 KG/M2 | TEMPERATURE: 97.8 F | WEIGHT: 281.8 LBS | HEART RATE: 88 BPM | DIASTOLIC BLOOD PRESSURE: 66 MMHG | SYSTOLIC BLOOD PRESSURE: 165 MMHG

## 2018-06-29 DIAGNOSIS — L73.8 BACTERIAL FOLLICULITIS: Primary | ICD-10-CM

## 2018-06-29 PROCEDURE — G8427 DOCREV CUR MEDS BY ELIG CLIN: HCPCS | Performed by: NURSE PRACTITIONER

## 2018-06-29 PROCEDURE — G8598 ASA/ANTIPLAT THER USED: HCPCS | Performed by: NURSE PRACTITIONER

## 2018-06-29 PROCEDURE — 99213 OFFICE O/P EST LOW 20 MIN: CPT | Performed by: NURSE PRACTITIONER

## 2018-06-29 PROCEDURE — G8399 PT W/DXA RESULTS DOCUMENT: HCPCS | Performed by: NURSE PRACTITIONER

## 2018-06-29 PROCEDURE — 1123F ACP DISCUSS/DSCN MKR DOCD: CPT | Performed by: NURSE PRACTITIONER

## 2018-06-29 PROCEDURE — 3017F COLORECTAL CA SCREEN DOC REV: CPT | Performed by: NURSE PRACTITIONER

## 2018-06-29 PROCEDURE — 1090F PRES/ABSN URINE INCON ASSESS: CPT | Performed by: NURSE PRACTITIONER

## 2018-06-29 PROCEDURE — 1036F TOBACCO NON-USER: CPT | Performed by: NURSE PRACTITIONER

## 2018-06-29 PROCEDURE — 4040F PNEUMOC VAC/ADMIN/RCVD: CPT | Performed by: NURSE PRACTITIONER

## 2018-06-29 PROCEDURE — G8417 CALC BMI ABV UP PARAM F/U: HCPCS | Performed by: NURSE PRACTITIONER

## 2018-06-29 RX ORDER — CLINDAMYCIN HYDROCHLORIDE 300 MG/1
300 CAPSULE ORAL 4 TIMES DAILY
Qty: 40 CAPSULE | Refills: 0 | Status: SHIPPED | OUTPATIENT
Start: 2018-06-29 | End: 2018-07-09

## 2018-06-29 RX ORDER — NYSTATIN 100000 [USP'U]/G
POWDER TOPICAL
Qty: 1 BOTTLE | Refills: 3 | Status: SHIPPED | OUTPATIENT
Start: 2018-06-29

## 2018-06-29 ASSESSMENT — ENCOUNTER SYMPTOMS
SORE THROAT: 0
SHORTNESS OF BREATH: 0
RHINORRHEA: 0
COUGH: 0
DIARRHEA: 0

## 2018-08-03 DIAGNOSIS — C20 RECTAL CANCER (HCC): Primary | ICD-10-CM

## 2018-08-06 ENCOUNTER — HOSPITAL ENCOUNTER (OUTPATIENT)
Dept: CT IMAGING | Age: 68
Discharge: HOME | End: 2018-08-08
Payer: MEDICARE

## 2018-08-06 ENCOUNTER — HOSPITAL ENCOUNTER (OUTPATIENT)
Dept: LAB | Age: 68
Discharge: HOME | End: 2018-08-06
Payer: MEDICARE

## 2018-08-06 DIAGNOSIS — C20 RECTAL CANCER (HCC): ICD-10-CM

## 2018-08-06 LAB
BUN BLDV-MCNC: 17 MG/DL (ref 8–23)
CREAT SERPL-MCNC: 0.97 MG/DL (ref 0.5–0.9)
GFR AFRICAN AMERICAN: >60 ML/MIN
GFR NON-AFRICAN AMERICAN: 57 ML/MIN
GFR SERPL CREATININE-BSD FRML MDRD: ABNORMAL ML/MIN/{1.73_M2}
GFR SERPL CREATININE-BSD FRML MDRD: ABNORMAL ML/MIN/{1.73_M2}

## 2018-08-06 PROCEDURE — 36415 COLL VENOUS BLD VENIPUNCTURE: CPT

## 2018-08-06 PROCEDURE — 74177 CT ABD & PELVIS W/CONTRAST: CPT

## 2018-08-06 PROCEDURE — 82565 ASSAY OF CREATININE: CPT

## 2018-08-06 PROCEDURE — 6360000004 HC RX CONTRAST MEDICATION: Performed by: INTERNAL MEDICINE

## 2018-08-06 PROCEDURE — 84520 ASSAY OF UREA NITROGEN: CPT

## 2018-08-06 RX ADMIN — IOPAMIDOL 100 ML: 612 INJECTION, SOLUTION INTRAVENOUS at 13:22

## 2018-08-15 ENCOUNTER — OFFICE VISIT (OUTPATIENT)
Dept: ONCOLOGY | Age: 68
End: 2018-08-15
Payer: MEDICARE

## 2018-08-15 VITALS
SYSTOLIC BLOOD PRESSURE: 143 MMHG | WEIGHT: 282 LBS | DIASTOLIC BLOOD PRESSURE: 75 MMHG | RESPIRATION RATE: 18 BRPM | TEMPERATURE: 98.2 F | HEIGHT: 65 IN | BODY MASS INDEX: 46.98 KG/M2 | HEART RATE: 64 BPM

## 2018-08-15 DIAGNOSIS — Z90.49 H/O RESECTION OF RECTUM: ICD-10-CM

## 2018-08-15 DIAGNOSIS — N18.30 CKD (CHRONIC KIDNEY DISEASE) STAGE 3, GFR 30-59 ML/MIN (HCC): ICD-10-CM

## 2018-08-15 DIAGNOSIS — C20 RECTAL CANCER (HCC): Primary | ICD-10-CM

## 2018-08-15 PROCEDURE — G8598 ASA/ANTIPLAT THER USED: HCPCS | Performed by: INTERNAL MEDICINE

## 2018-08-15 PROCEDURE — 3017F COLORECTAL CA SCREEN DOC REV: CPT | Performed by: INTERNAL MEDICINE

## 2018-08-15 PROCEDURE — 1090F PRES/ABSN URINE INCON ASSESS: CPT | Performed by: INTERNAL MEDICINE

## 2018-08-15 PROCEDURE — 4040F PNEUMOC VAC/ADMIN/RCVD: CPT | Performed by: INTERNAL MEDICINE

## 2018-08-15 PROCEDURE — G8417 CALC BMI ABV UP PARAM F/U: HCPCS | Performed by: INTERNAL MEDICINE

## 2018-08-15 PROCEDURE — 1036F TOBACCO NON-USER: CPT | Performed by: INTERNAL MEDICINE

## 2018-08-15 PROCEDURE — 1101F PT FALLS ASSESS-DOCD LE1/YR: CPT | Performed by: INTERNAL MEDICINE

## 2018-08-15 PROCEDURE — 1123F ACP DISCUSS/DSCN MKR DOCD: CPT | Performed by: INTERNAL MEDICINE

## 2018-08-15 PROCEDURE — G8399 PT W/DXA RESULTS DOCUMENT: HCPCS | Performed by: INTERNAL MEDICINE

## 2018-08-15 PROCEDURE — G8427 DOCREV CUR MEDS BY ELIG CLIN: HCPCS | Performed by: INTERNAL MEDICINE

## 2018-08-15 PROCEDURE — 99214 OFFICE O/P EST MOD 30 MIN: CPT | Performed by: INTERNAL MEDICINE

## 2018-08-15 NOTE — PROGRESS NOTES
Reason for the visit:   Chief Complaint   Patient presents with    Rectal Cancer       Pertinent Clinical Problems/ Treatments:    1. Rectal cancer was diagnosed in 2012  2. S/p  resection of] LAR] on October 11, 2011, followed by XRT   3. Post resection stage is T4 N1  4. S/p 6 months of adjuvant FOLFOX concluded June 11, 2013  5. Severe right hydroureteronephroses: ? Scar/prior radiation. New CKD, stable  6. Rising CEA, CT scan did not show any metastatic disease. PET CT scan March 21/2018 showed positive findings. Negative attempted biopsy. Summary of the case:    A 59-year-old woman with rectal cancer that was locally advanced invading the uterus at diagnosis and also with positive perirectal lymph nodes biopsy proven (T4N1). Status post low anterior resection 10/11/12 with total abdominal hysterectomy/bilateral salpingo-oophorectomy. The surgeon reported a residual lymph node in the pelvis that could not be resected because it was attached to a pelvis blood vessel. Therefore, next she had postop radiation therapy with a boost to that lymph node area. Then adjuvant   FOLFOX. She did have the radiosensitizing 5-FU during radiation. She concluded all treatment in June 2013, and she has been monitored closely since that time  In December, 2015 CEA went up from baseline of 2-3 up to the 5-6 range, CT scan was done and was essentially negative but the CEA continue to rise. we decided to obtain a PET-CT scan   PET scan showed no evidence of metastatic disease. We decided to go back and observe. Interim HX/HPI :    Patient is here today for follow-up rectal cancer. CT scan was done and showed no evidence of progressive or recurrent disease and it was very reassuring. The patient was informed about the findings. She is overall feeling well. She continues to complain about rash on her both lower extremity.   She was referred to dermatologist and she does not have a specific diagnosis    Lab Results Component Value Date    CEA 50.4 (H) 02/27/2018       Past Medical History   has a past medical history of Abnormal cardiac cath; Anxiety; Asthma; CAD (coronary artery disease); CKD (chronic kidney disease) stage 3, GFR 30-59 ml/min; Colovaginal fistula; Depression; Hyperlipidemia; Hypertension; IBS (irritable bowel syndrome); Morbid (severe) obesity due to excess calories ([address] Multiple allergies; Obesity; Obesity due to excess calories; LUI (obstructive sleep apnea); Rectal cancer ([address] SIRS due to infectious process without acute organ dysfunction ([facility] [address] Sleep apnea; and Unspecified cerebral artery occlusion with cerebral infarction. Surgical History   has a past surgical history that includes Cholecystectomy (1992); Carpal tunnel release; shoulder surgery; knee surgery; Dilation and curettage of uterus; Tubal ligation; colectomy (2012); Kidney surgery (Right, 2012); Tunneled venous port placement (2013); Abdomen surgery; cystourethroscopy/stent removal (Right, 03/25/2013); other surgical history (08-); Cystocopy; Cystocopy (12/11/13); cystourethroscopy (Right); cystourethroscopy/stent removal (Right, 5/20/14); Colonoscopy; Colonoscopy (08-); Colonoscopy (2/22/2016); Hysterectomy, total abdominal (2012); pr colonoscopy flx dx w/collj spec when pfrmd (N/A, 4/16/2018); and Colonoscopy (04/16/2018). Home Medications  has a current medication list which includes the following prescription(s): nystatin, albuterol sulfate hfa, hydrochlorothiazide, losartan, clopidogrel, walker, knee brace/hinged bars xl, citalopram, addison-fit natura drainable pouch, addison-fit natura durahesive 45mm, brava moldable ring, meloxicam, atorvastatin, fluticasone-salmeterol, lorazepam, vitamin d, calcitriol, aspirin ec, and levetiracetam.    Allergies:  Zithromax [azithromycin dihydrate] and Seasonal        Review of Systems :    Constitutional: No fever or chills.  No night sweats, Fatigue ,   HEENT: negative for sore

## 2018-08-15 NOTE — LETTER
addison-fit natura drainable pouch, addison-fit natura durahesive 45mm, brava moldable ring, meloxicam, atorvastatin, fluticasone-salmeterol, lorazepam, vitamin d, calcitriol, aspirin ec, and levetiracetam.    Allergies:  Zithromax [azithromycin dihydrate] and Seasonal        Review of Systems :    Constitutional: No fever or chills. No night sweats, Fatigue ,   HEENT: negative for sore mouth, sore throat, hoarseness and voice change   Respiratory: negative for cough , sputum, dyspnea, wheezing, hemoptysis, chest pain   Cardiovascular: negative for chest pain, dyspnea, palpitations, orthopnea, PND   Gastrointestinal: negative for nausea, vomiting, diarrhea, constipation, abdominal pain, Dysphagia, hematemesis and hematochezia , colostomy is working well   Genitourinary: No burning urination, no hematuria or frequency  Hematologic/Lymphatic: negative for easy bruising, bleeding, lymphadenopathy, petechiae and swelling/edema   Endocrine: negative for heat or cold intolerance, tremor, weight changes, change in bowel habits and hair loss   Musculoskeletal: She has pain and tenderness in her right biceps area.   Neurological: neuropathic pain in her right thigh unchanged       Physical Examination :       BP (!) 143/75 (Site: Right Arm, Position: Sitting, Cuff Size: Large Adult)   Pulse 64   Temp 98.2 °F (36.8 °C) (Temporal)   Resp 18   Ht 5' 5\" (1.651 m)   Wt 282 lb (127.9 kg)   BMI 46.93 kg/m²      Performance Status:Estimated performance status is ECOG 1    General appearance - well appearing, no in pain or distress   Mental status - alert and cooperative   Neck - supple, no significant adenopathy ,trach is central   Lymphatics - no palpable lymphadenopathy, no hepatosplenomegaly   Chest - clear to auscultation, no wheezes, rales or rhonchi, symmetric air entry   Heart - normal rate, regular rhythm, normal S1, S2, no murmurs,   Abdomen - soft, nontender, nondistended, no masses or organomegaly , she

## 2018-08-17 RX ORDER — MELOXICAM 7.5 MG/1
7.5 TABLET ORAL DAILY
Qty: 90 TABLET | Refills: 0 | Status: SHIPPED | OUTPATIENT
Start: 2018-08-17 | End: 2018-10-25 | Stop reason: SDUPTHER

## 2018-08-23 ENCOUNTER — HOSPITAL ENCOUNTER (OUTPATIENT)
Age: 68
Discharge: HOME | End: 2018-08-23
Payer: MEDICARE

## 2018-08-23 DIAGNOSIS — C20 RECTAL CANCER (HCC): ICD-10-CM

## 2018-08-23 DIAGNOSIS — N18.30 CKD (CHRONIC KIDNEY DISEASE) STAGE 3, GFR 30-59 ML/MIN (HCC): ICD-10-CM

## 2018-08-23 DIAGNOSIS — Z90.49 H/O RESECTION OF RECTUM: ICD-10-CM

## 2018-08-23 LAB
-: ABNORMAL
ABSOLUTE EOS #: 0.23 K/UL (ref 0–0.44)
ABSOLUTE IMMATURE GRANULOCYTE: 0.03 K/UL (ref 0–0.3)
ABSOLUTE LYMPH #: 1.25 K/UL (ref 1.1–3.7)
ABSOLUTE MONO #: 0.57 K/UL (ref 0.1–1.2)
ALBUMIN SERPL-MCNC: 3.8 G/DL (ref 3.5–5.2)
ALBUMIN/GLOBULIN RATIO: 1.3 (ref 1–2.5)
ALP BLD-CCNC: 113 U/L (ref 35–104)
ALT SERPL-CCNC: 17 U/L (ref 5–33)
AMORPHOUS: ABNORMAL
ANION GAP SERPL CALCULATED.3IONS-SCNC: 13 MMOL/L (ref 9–17)
AST SERPL-CCNC: 17 U/L
BACTERIA: ABNORMAL
BASOPHILS # BLD: 1 % (ref 0–2)
BASOPHILS ABSOLUTE: 0.04 K/UL (ref 0–0.2)
BILIRUB SERPL-MCNC: 0.41 MG/DL (ref 0.3–1.2)
BILIRUBIN URINE: NEGATIVE
BUN BLDV-MCNC: 22 MG/DL (ref 8–23)
BUN/CREAT BLD: 21 (ref 9–20)
CALCIUM SERPL-MCNC: 9.5 MG/DL (ref 8.6–10.4)
CARCINOEMBRYONIC ANTIGEN: 64.9 NG/ML
CASTS UA: ABNORMAL /LPF
CHLORIDE BLD-SCNC: 101 MMOL/L (ref 98–107)
CO2: 24 MMOL/L (ref 20–31)
COLOR: YELLOW
COMMENT UA: ABNORMAL
CREAT SERPL-MCNC: 1.03 MG/DL (ref 0.5–0.9)
CREATININE URINE: 64.6 MG/DL (ref 28–217)
CRYSTALS, UA: ABNORMAL /HPF
DIFFERENTIAL TYPE: ABNORMAL
EOSINOPHILS RELATIVE PERCENT: 3 % (ref 1–4)
EPITHELIAL CELLS UA: ABNORMAL /HPF (ref 0–25)
GFR AFRICAN AMERICAN: >60 ML/MIN
GFR NON-AFRICAN AMERICAN: 53 ML/MIN
GFR SERPL CREATININE-BSD FRML MDRD: ABNORMAL ML/MIN/{1.73_M2}
GFR SERPL CREATININE-BSD FRML MDRD: ABNORMAL ML/MIN/{1.73_M2}
GLUCOSE BLD-MCNC: 101 MG/DL (ref 70–99)
GLUCOSE URINE: NEGATIVE
HCT VFR BLD CALC: 43.9 % (ref 36.3–47.1)
HEMOGLOBIN: 13.6 G/DL (ref 11.9–15.1)
IMMATURE GRANULOCYTES: 0 %
KETONES, URINE: NEGATIVE
LEUKOCYTE ESTERASE, URINE: ABNORMAL
LYMPHOCYTES # BLD: 17 % (ref 24–43)
MAGNESIUM: 2.2 MG/DL (ref 1.6–2.6)
MCH RBC QN AUTO: 25.8 PG (ref 25.2–33.5)
MCHC RBC AUTO-ENTMCNC: 31 G/DL (ref 28.4–34.8)
MCV RBC AUTO: 83.1 FL (ref 82.6–102.9)
MONOCYTES # BLD: 8 % (ref 3–12)
MUCUS: ABNORMAL
NITRITE, URINE: NEGATIVE
NRBC AUTOMATED: 0 PER 100 WBC
OTHER OBSERVATIONS UA: ABNORMAL
PDW BLD-RTO: 14.5 % (ref 11.8–14.4)
PH UA: 6 (ref 5–9)
PHOSPHORUS: 3.1 MG/DL (ref 2.6–4.5)
PLATELET # BLD: 190 K/UL (ref 138–453)
PLATELET ESTIMATE: ABNORMAL
PMV BLD AUTO: 11.4 FL (ref 8.1–13.5)
POTASSIUM SERPL-SCNC: 4.9 MMOL/L (ref 3.7–5.3)
PROTEIN UA: NEGATIVE
PTH INTACT: 88.27 PG/ML (ref 15–65)
RBC # BLD: 5.28 M/UL (ref 3.95–5.11)
RBC # BLD: ABNORMAL 10*6/UL
RBC UA: ABNORMAL /HPF (ref 0–2)
RENAL EPITHELIAL, UA: ABNORMAL /HPF
SEG NEUTROPHILS: 71 % (ref 36–65)
SEGMENTED NEUTROPHILS ABSOLUTE COUNT: 5.32 K/UL (ref 1.5–8.1)
SODIUM BLD-SCNC: 138 MMOL/L (ref 135–144)
SPECIFIC GRAVITY UA: 1.02 (ref 1.01–1.02)
TOTAL PROTEIN, URINE: 5 MG/DL
TOTAL PROTEIN: 6.8 G/DL (ref 6.4–8.3)
TRICHOMONAS: ABNORMAL
TURBIDITY: CLEAR
URIC ACID: 6.9 MG/DL (ref 2.4–5.7)
URINE HGB: ABNORMAL
UROBILINOGEN, URINE: NORMAL
WBC # BLD: 7.4 K/UL (ref 3.5–11.3)
WBC # BLD: ABNORMAL 10*3/UL
WBC UA: ABNORMAL /HPF (ref 0–5)
YEAST: ABNORMAL

## 2018-08-23 PROCEDURE — 84100 ASSAY OF PHOSPHORUS: CPT

## 2018-08-23 PROCEDURE — 80053 COMPREHEN METABOLIC PANEL: CPT

## 2018-08-23 PROCEDURE — 82378 CARCINOEMBRYONIC ANTIGEN: CPT

## 2018-08-23 PROCEDURE — 82570 ASSAY OF URINE CREATININE: CPT

## 2018-08-23 PROCEDURE — 36415 COLL VENOUS BLD VENIPUNCTURE: CPT

## 2018-08-23 PROCEDURE — 81001 URINALYSIS AUTO W/SCOPE: CPT

## 2018-08-23 PROCEDURE — 84550 ASSAY OF BLOOD/URIC ACID: CPT

## 2018-08-23 PROCEDURE — 83970 ASSAY OF PARATHORMONE: CPT

## 2018-08-23 PROCEDURE — 83735 ASSAY OF MAGNESIUM: CPT

## 2018-08-23 PROCEDURE — 84156 ASSAY OF PROTEIN URINE: CPT

## 2018-08-23 PROCEDURE — 85025 COMPLETE CBC W/AUTO DIFF WBC: CPT

## 2018-10-25 ENCOUNTER — OFFICE VISIT (OUTPATIENT)
Dept: PRIMARY CARE CLINIC | Age: 68
End: 2018-10-25
Payer: MEDICARE

## 2018-10-25 VITALS
HEART RATE: 95 BPM | WEIGHT: 283.8 LBS | BODY MASS INDEX: 47.23 KG/M2 | OXYGEN SATURATION: 98 % | DIASTOLIC BLOOD PRESSURE: 82 MMHG | RESPIRATION RATE: 20 BRPM | SYSTOLIC BLOOD PRESSURE: 142 MMHG | TEMPERATURE: 97.9 F

## 2018-10-25 DIAGNOSIS — I10 ESSENTIAL HYPERTENSION: Primary | ICD-10-CM

## 2018-10-25 DIAGNOSIS — E78.2 MIXED HYPERLIPIDEMIA: ICD-10-CM

## 2018-10-25 DIAGNOSIS — Z23 NEED FOR INFLUENZA VACCINATION: ICD-10-CM

## 2018-10-25 PROCEDURE — 99214 OFFICE O/P EST MOD 30 MIN: CPT | Performed by: NURSE PRACTITIONER

## 2018-10-25 PROCEDURE — G8417 CALC BMI ABV UP PARAM F/U: HCPCS | Performed by: NURSE PRACTITIONER

## 2018-10-25 PROCEDURE — G8399 PT W/DXA RESULTS DOCUMENT: HCPCS | Performed by: NURSE PRACTITIONER

## 2018-10-25 PROCEDURE — 90686 IIV4 VACC NO PRSV 0.5 ML IM: CPT | Performed by: NURSE PRACTITIONER

## 2018-10-25 PROCEDURE — G8427 DOCREV CUR MEDS BY ELIG CLIN: HCPCS | Performed by: NURSE PRACTITIONER

## 2018-10-25 PROCEDURE — G8598 ASA/ANTIPLAT THER USED: HCPCS | Performed by: NURSE PRACTITIONER

## 2018-10-25 PROCEDURE — G0008 ADMIN INFLUENZA VIRUS VAC: HCPCS | Performed by: NURSE PRACTITIONER

## 2018-10-25 PROCEDURE — 3017F COLORECTAL CA SCREEN DOC REV: CPT | Performed by: NURSE PRACTITIONER

## 2018-10-25 PROCEDURE — 1101F PT FALLS ASSESS-DOCD LE1/YR: CPT | Performed by: NURSE PRACTITIONER

## 2018-10-25 PROCEDURE — G8482 FLU IMMUNIZE ORDER/ADMIN: HCPCS | Performed by: NURSE PRACTITIONER

## 2018-10-25 PROCEDURE — 1090F PRES/ABSN URINE INCON ASSESS: CPT | Performed by: NURSE PRACTITIONER

## 2018-10-25 PROCEDURE — 1123F ACP DISCUSS/DSCN MKR DOCD: CPT | Performed by: NURSE PRACTITIONER

## 2018-10-25 PROCEDURE — 1036F TOBACCO NON-USER: CPT | Performed by: NURSE PRACTITIONER

## 2018-10-25 PROCEDURE — 4040F PNEUMOC VAC/ADMIN/RCVD: CPT | Performed by: NURSE PRACTITIONER

## 2018-10-25 RX ORDER — ATORVASTATIN CALCIUM 40 MG/1
40 TABLET, FILM COATED ORAL DAILY
Qty: 90 TABLET | Refills: 3 | Status: SHIPPED | OUTPATIENT
Start: 2018-10-25

## 2018-10-25 RX ORDER — MELOXICAM 7.5 MG/1
7.5 TABLET ORAL DAILY
Qty: 90 TABLET | Refills: 0 | Status: SHIPPED | OUTPATIENT
Start: 2018-10-25 | End: 2019-01-28 | Stop reason: SDUPTHER

## 2018-10-25 RX ORDER — LOSARTAN POTASSIUM 100 MG/1
100 TABLET ORAL DAILY
Qty: 90 TABLET | Refills: 3 | Status: SHIPPED | OUTPATIENT
Start: 2018-10-25

## 2018-10-25 RX ORDER — HYDROCHLOROTHIAZIDE 12.5 MG/1
12.5 CAPSULE, GELATIN COATED ORAL DAILY
Qty: 90 CAPSULE | Refills: 3 | Status: SHIPPED | OUTPATIENT
Start: 2018-10-25

## 2018-10-25 RX ORDER — CITALOPRAM 40 MG/1
40 TABLET ORAL DAILY
Qty: 90 TABLET | Refills: 3 | Status: SHIPPED | OUTPATIENT
Start: 2018-10-25

## 2018-10-25 ASSESSMENT — ENCOUNTER SYMPTOMS
CONSTIPATION: 0
RHINORRHEA: 0
BLURRED VISION: 0
COUGH: 0
ABDOMINAL PAIN: 0
NAUSEA: 0
VOMITING: 0
WHEEZING: 0
DIARRHEA: 0
SHORTNESS OF BREATH: 1
SORE THROAT: 0

## 2018-10-25 ASSESSMENT — PATIENT HEALTH QUESTIONNAIRE - PHQ9
SUM OF ALL RESPONSES TO PHQ QUESTIONS 1-9: 0
SUM OF ALL RESPONSES TO PHQ QUESTIONS 1-9: 0
1. LITTLE INTEREST OR PLEASURE IN DOING THINGS: 0
SUM OF ALL RESPONSES TO PHQ9 QUESTIONS 1 & 2: 0
2. FEELING DOWN, DEPRESSED OR HOPELESS: 0

## 2018-11-09 ENCOUNTER — HOSPITAL ENCOUNTER (OUTPATIENT)
Age: 68
Discharge: HOME | End: 2018-11-09
Payer: MEDICARE

## 2018-11-09 DIAGNOSIS — Z90.49 H/O RESECTION OF RECTUM: ICD-10-CM

## 2018-11-09 DIAGNOSIS — N18.30 CKD (CHRONIC KIDNEY DISEASE) STAGE 3, GFR 30-59 ML/MIN (HCC): ICD-10-CM

## 2018-11-09 DIAGNOSIS — C20 RECTAL CANCER (HCC): ICD-10-CM

## 2018-11-09 LAB
ABSOLUTE EOS #: 0.23 K/UL (ref 0–0.44)
ABSOLUTE IMMATURE GRANULOCYTE: 0.03 K/UL (ref 0–0.3)
ABSOLUTE LYMPH #: 1.39 K/UL (ref 1.1–3.7)
ABSOLUTE MONO #: 0.42 K/UL (ref 0.1–1.2)
ALBUMIN SERPL-MCNC: 3.9 G/DL (ref 3.5–5.2)
ALBUMIN/GLOBULIN RATIO: 1.2 (ref 1–2.5)
ALP BLD-CCNC: 107 U/L (ref 35–104)
ALT SERPL-CCNC: 12 U/L (ref 5–33)
ANION GAP SERPL CALCULATED.3IONS-SCNC: 14 MMOL/L (ref 9–17)
AST SERPL-CCNC: 15 U/L
BASOPHILS # BLD: 1 % (ref 0–2)
BASOPHILS ABSOLUTE: 0.05 K/UL (ref 0–0.2)
BILIRUB SERPL-MCNC: 0.41 MG/DL (ref 0.3–1.2)
BUN BLDV-MCNC: 17 MG/DL (ref 8–23)
BUN/CREAT BLD: 17 (ref 9–20)
CALCIUM SERPL-MCNC: 9.3 MG/DL (ref 8.6–10.4)
CARCINOEMBRYONIC ANTIGEN: 86.5 NG/ML
CHLORIDE BLD-SCNC: 101 MMOL/L (ref 98–107)
CO2: 24 MMOL/L (ref 20–31)
CREAT SERPL-MCNC: 1.01 MG/DL (ref 0.5–0.9)
DIFFERENTIAL TYPE: ABNORMAL
EOSINOPHILS RELATIVE PERCENT: 3 % (ref 1–4)
GFR AFRICAN AMERICAN: >60 ML/MIN
GFR NON-AFRICAN AMERICAN: 54 ML/MIN
GFR SERPL CREATININE-BSD FRML MDRD: ABNORMAL ML/MIN/{1.73_M2}
GFR SERPL CREATININE-BSD FRML MDRD: ABNORMAL ML/MIN/{1.73_M2}
GLUCOSE BLD-MCNC: 102 MG/DL (ref 70–99)
HCT VFR BLD CALC: 42.1 % (ref 36.3–47.1)
HEMOGLOBIN: 13.3 G/DL (ref 11.9–15.1)
IMMATURE GRANULOCYTES: 0 %
LYMPHOCYTES # BLD: 19 % (ref 24–43)
MCH RBC QN AUTO: 26 PG (ref 25.2–33.5)
MCHC RBC AUTO-ENTMCNC: 31.6 G/DL (ref 28.4–34.8)
MCV RBC AUTO: 82.2 FL (ref 82.6–102.9)
MONOCYTES # BLD: 6 % (ref 3–12)
NRBC AUTOMATED: 0 PER 100 WBC
PDW BLD-RTO: 14.3 % (ref 11.8–14.4)
PLATELET # BLD: 201 K/UL (ref 138–453)
PLATELET ESTIMATE: ABNORMAL
PMV BLD AUTO: 10.8 FL (ref 8.1–13.5)
POTASSIUM SERPL-SCNC: 4.1 MMOL/L (ref 3.7–5.3)
RBC # BLD: 5.12 M/UL (ref 3.95–5.11)
RBC # BLD: ABNORMAL 10*6/UL
SEG NEUTROPHILS: 71 % (ref 36–65)
SEGMENTED NEUTROPHILS ABSOLUTE COUNT: 5.1 K/UL (ref 1.5–8.1)
SODIUM BLD-SCNC: 139 MMOL/L (ref 135–144)
TOTAL PROTEIN: 7.1 G/DL (ref 6.4–8.3)
WBC # BLD: 7.2 K/UL (ref 3.5–11.3)
WBC # BLD: ABNORMAL 10*3/UL

## 2018-11-09 PROCEDURE — 85025 COMPLETE CBC W/AUTO DIFF WBC: CPT

## 2018-11-09 PROCEDURE — 80053 COMPREHEN METABOLIC PANEL: CPT

## 2018-11-09 PROCEDURE — 82378 CARCINOEMBRYONIC ANTIGEN: CPT

## 2018-11-09 PROCEDURE — 36415 COLL VENOUS BLD VENIPUNCTURE: CPT

## 2018-11-16 ENCOUNTER — OFFICE VISIT (OUTPATIENT)
Dept: ONCOLOGY | Age: 68
End: 2018-11-16
Payer: MEDICARE

## 2018-11-16 VITALS
HEART RATE: 71 BPM | WEIGHT: 287.6 LBS | TEMPERATURE: 97.7 F | RESPIRATION RATE: 20 BRPM | DIASTOLIC BLOOD PRESSURE: 79 MMHG | HEIGHT: 65 IN | SYSTOLIC BLOOD PRESSURE: 150 MMHG | BODY MASS INDEX: 47.92 KG/M2

## 2018-11-16 DIAGNOSIS — C20 RECTAL CANCER (HCC): Primary | ICD-10-CM

## 2018-11-16 PROCEDURE — 1036F TOBACCO NON-USER: CPT | Performed by: INTERNAL MEDICINE

## 2018-11-16 PROCEDURE — G8417 CALC BMI ABV UP PARAM F/U: HCPCS | Performed by: INTERNAL MEDICINE

## 2018-11-16 PROCEDURE — G8598 ASA/ANTIPLAT THER USED: HCPCS | Performed by: INTERNAL MEDICINE

## 2018-11-16 PROCEDURE — 99214 OFFICE O/P EST MOD 30 MIN: CPT | Performed by: INTERNAL MEDICINE

## 2018-11-16 PROCEDURE — 1090F PRES/ABSN URINE INCON ASSESS: CPT | Performed by: INTERNAL MEDICINE

## 2018-11-16 PROCEDURE — G8427 DOCREV CUR MEDS BY ELIG CLIN: HCPCS | Performed by: INTERNAL MEDICINE

## 2018-11-16 PROCEDURE — 1123F ACP DISCUSS/DSCN MKR DOCD: CPT | Performed by: INTERNAL MEDICINE

## 2018-11-16 PROCEDURE — G8399 PT W/DXA RESULTS DOCUMENT: HCPCS | Performed by: INTERNAL MEDICINE

## 2018-11-16 PROCEDURE — 3017F COLORECTAL CA SCREEN DOC REV: CPT | Performed by: INTERNAL MEDICINE

## 2018-11-16 PROCEDURE — 1101F PT FALLS ASSESS-DOCD LE1/YR: CPT | Performed by: INTERNAL MEDICINE

## 2018-11-16 PROCEDURE — G8482 FLU IMMUNIZE ORDER/ADMIN: HCPCS | Performed by: INTERNAL MEDICINE

## 2018-11-16 PROCEDURE — 4040F PNEUMOC VAC/ADMIN/RCVD: CPT | Performed by: INTERNAL MEDICINE

## 2018-11-18 NOTE — PROGRESS NOTES
Reason for the visit:   Chief Complaint   Patient presents with    Colon Cancer       Pertinent Clinical Problems/ Treatments:    1. Rectal cancer was diagnosed in 2012  2. S/p  resection of] LAR] on October 11, 2011, followed by XRT   3. Post resection stage is T4 N1  4. S/p 6 months of adjuvant FOLFOX concluded June 11, 2013  5. Severe right hydroureteronephroses: ? Scar/prior radiation. New CKD, stable  6. Rising CEA, CT scan did not show any metastatic disease. PET CT scan March 21/2018 showed positive findings. Negative attempted biopsy. Summary of the case:    A 25-year-old woman with rectal cancer that was locally advanced invading the uterus at diagnosis and also with positive perirectal lymph nodes biopsy proven (T4N1). Status post low anterior resection 10/11/12 with total abdominal hysterectomy/bilateral salpingo-oophorectomy. The surgeon reported a residual lymph node in the pelvis that could not be resected because it was attached to a pelvis blood vessel. Therefore, next she had postop radiation therapy with a boost to that lymph node area. Then adjuvant   FOLFOX. She did have the radiosensitizing 5-FU during radiation. She concluded all treatment in June 2013, and she has been monitored closely since that time  In December, 2015 CEA went up from baseline of 2-3 up to the 5-6 range, CT scan was done and was essentially negative but the CEA continue to rise. we decided to obtain a PET-CT scan   PET scan showed no evidence of metastatic disease. We decided to go back and observe. Interim HX/HPI :  Patient is here today for follow-up rectal cancer. CT scan was done August 2018 and showed no evidence of progressive or recurrent disease and it was very reassuring. The patient was informed about the findings. She is overall feeling well. No complaints. No abdominal pain. No GI bleeding.      Lab Results   Component Value Date    CEA 86.5 (H) 11/09/2018       Past Medical History   has a past medical history of Abnormal cardiac cath; Anxiety; Asthma; CAD (coronary artery disease); CKD (chronic kidney disease) stage 3, GFR 30-59 ml/min ([facility] [address] Colovaginal fistula; Depression; Hyperlipidemia; Hypertension; IBS (irritable bowel syndrome); Morbid (severe) obesity due to excess calories ([facility] [address] Multiple allergies; Obesity; Obesity due to excess calories; LUI (obstructive sleep apnea); Rectal cancer ([facility] [address] SIRS due to infectious process without acute organ dysfunction ([facility] [address] Sleep apnea; and Unspecified cerebral artery occlusion with cerebral infarction. Surgical History   has a past surgical history that includes Cholecystectomy (1992); Carpal tunnel release; shoulder surgery; knee surgery; Dilation and curettage of uterus; Tubal ligation; colectomy (2012); Kidney surgery (Right, 2012); Tunneled venous port placement (2013); Abdomen surgery; cystourethroscopy/stent removal (Right, 03/25/2013); other surgical history (08-); Cystocopy; Cystocopy (12/11/13); cystourethroscopy (Right); cystourethroscopy/stent removal (Right, 5/20/14); Colonoscopy; Colonoscopy (08-); Colonoscopy (2/22/2016); Hysterectomy, total abdominal (2012); pr colonoscopy flx dx w/collj spec when pfrmd (N/A, 4/16/2018); and Colonoscopy (04/16/2018). Home Medications  has a current medication list which includes the following prescription(s): hydrochlorothiazide, losartan, citalopram, atorvastatin, fluticasone-salmeterol, meloxicam, nystatin, albuterol sulfate hfa, clopidogrel, walker, knee brace/hinged bars xl, addison-fit natura drainable pouch, addison-fit natura durahesive 45mm, brava moldable ring, lorazepam, vitamin d, calcitriol, aspirin ec, and levetiracetam.    Allergies:  Zithromax [azithromycin dihydrate] and Seasonal        Review of Systems :    Constitutional: No fever or chills.  No night sweats, Fatigue ,   HEENT: negative for sore mouth, sore throat, hoarseness and voice change   Respiratory: negative for

## 2018-11-20 ENCOUNTER — TELEPHONE (OUTPATIENT)
Dept: PRIMARY CARE CLINIC | Age: 68
End: 2018-11-20

## 2018-11-20 NOTE — TELEPHONE ENCOUNTER
Called pt to schedule BP check, pt states is planning on stopping by office next week with  when he gets his flu shot and will have BP checked then.

## 2018-11-26 ENCOUNTER — NURSE ONLY (OUTPATIENT)
Dept: PRIMARY CARE CLINIC | Age: 68
End: 2018-11-26

## 2018-11-26 VITALS
HEART RATE: 77 BPM | DIASTOLIC BLOOD PRESSURE: 98 MMHG | RESPIRATION RATE: 20 BRPM | TEMPERATURE: 97 F | SYSTOLIC BLOOD PRESSURE: 160 MMHG

## 2018-11-26 DIAGNOSIS — I10 ESSENTIAL HYPERTENSION: Primary | ICD-10-CM

## 2019-01-04 ENCOUNTER — HOSPITAL ENCOUNTER (OUTPATIENT)
Age: 69
Discharge: HOME | End: 2019-01-04
Payer: COMMERCIAL

## 2019-01-04 LAB
-: ABNORMAL
ALBUMIN SERPL-MCNC: 4 G/DL (ref 3.5–5.2)
AMORPHOUS: ABNORMAL
ANION GAP SERPL CALCULATED.3IONS-SCNC: 11 MMOL/L (ref 9–17)
BACTERIA: ABNORMAL
BILIRUBIN URINE: NEGATIVE
BUN BLDV-MCNC: 19 MG/DL (ref 8–23)
BUN/CREAT BLD: 18 (ref 9–20)
CALCIUM SERPL-MCNC: 10 MG/DL (ref 8.6–10.4)
CASTS UA: ABNORMAL /LPF
CHLORIDE BLD-SCNC: 103 MMOL/L (ref 98–107)
CO2: 26 MMOL/L (ref 20–31)
COLOR: YELLOW
COMMENT UA: ABNORMAL
CREAT SERPL-MCNC: 1.07 MG/DL (ref 0.5–0.9)
CREATININE URINE: 78.1 MG/DL (ref 28–217)
CRYSTALS, UA: ABNORMAL /HPF
EPITHELIAL CELLS UA: ABNORMAL /HPF (ref 0–25)
GFR AFRICAN AMERICAN: >60 ML/MIN
GFR NON-AFRICAN AMERICAN: 51 ML/MIN
GFR SERPL CREATININE-BSD FRML MDRD: ABNORMAL ML/MIN/{1.73_M2}
GFR SERPL CREATININE-BSD FRML MDRD: ABNORMAL ML/MIN/{1.73_M2}
GLUCOSE BLD-MCNC: 89 MG/DL (ref 70–99)
GLUCOSE URINE: NEGATIVE
HCT VFR BLD CALC: 42.2 % (ref 36.3–47.1)
HEMOGLOBIN: 13.2 G/DL (ref 11.9–15.1)
KETONES, URINE: NEGATIVE
LEUKOCYTE ESTERASE, URINE: ABNORMAL
MAGNESIUM: 2.2 MG/DL (ref 1.6–2.6)
MCH RBC QN AUTO: 26.2 PG (ref 25.2–33.5)
MCHC RBC AUTO-ENTMCNC: 31.3 G/DL (ref 28.4–34.8)
MCV RBC AUTO: 83.9 FL (ref 82.6–102.9)
MUCUS: ABNORMAL
NITRITE, URINE: NEGATIVE
NRBC AUTOMATED: 0 PER 100 WBC
OTHER OBSERVATIONS UA: ABNORMAL
PDW BLD-RTO: 14.8 % (ref 11.8–14.4)
PH UA: 6 (ref 5–9)
PHOSPHORUS: 3.5 MG/DL (ref 2.6–4.5)
PLATELET # BLD: 229 K/UL (ref 138–453)
PMV BLD AUTO: 11.2 FL (ref 8.1–13.5)
POTASSIUM SERPL-SCNC: 4.4 MMOL/L (ref 3.7–5.3)
PROTEIN UA: NEGATIVE
RBC # BLD: 5.03 M/UL (ref 3.95–5.11)
RBC UA: ABNORMAL /HPF (ref 0–2)
RENAL EPITHELIAL, UA: ABNORMAL /HPF
SODIUM BLD-SCNC: 140 MMOL/L (ref 135–144)
SPECIFIC GRAVITY UA: 1.02 (ref 1.01–1.02)
TOTAL PROTEIN, URINE: 6 MG/DL
TRICHOMONAS: ABNORMAL
TURBIDITY: CLEAR
URIC ACID: 6.5 MG/DL (ref 2.4–5.7)
URINE HGB: ABNORMAL
UROBILINOGEN, URINE: NORMAL
WBC # BLD: 8.5 K/UL (ref 3.5–11.3)
WBC UA: ABNORMAL /HPF (ref 0–5)
YEAST: ABNORMAL

## 2019-01-04 PROCEDURE — 83735 ASSAY OF MAGNESIUM: CPT

## 2019-01-04 PROCEDURE — 36415 COLL VENOUS BLD VENIPUNCTURE: CPT

## 2019-01-04 PROCEDURE — 84550 ASSAY OF BLOOD/URIC ACID: CPT

## 2019-01-04 PROCEDURE — 82570 ASSAY OF URINE CREATININE: CPT

## 2019-01-04 PROCEDURE — 81001 URINALYSIS AUTO W/SCOPE: CPT

## 2019-01-04 PROCEDURE — 80069 RENAL FUNCTION PANEL: CPT

## 2019-01-04 PROCEDURE — 84156 ASSAY OF PROTEIN URINE: CPT

## 2019-01-04 PROCEDURE — 85027 COMPLETE CBC AUTOMATED: CPT

## 2019-01-15 ENCOUNTER — OFFICE VISIT (OUTPATIENT)
Dept: OBGYN | Age: 69
End: 2019-01-15
Payer: COMMERCIAL

## 2019-01-15 VITALS
SYSTOLIC BLOOD PRESSURE: 142 MMHG | DIASTOLIC BLOOD PRESSURE: 70 MMHG | WEIGHT: 292 LBS | BODY MASS INDEX: 46.93 KG/M2 | HEIGHT: 66 IN

## 2019-01-15 DIAGNOSIS — Z01.419 WELL WOMAN EXAM WITH ROUTINE GYNECOLOGICAL EXAM: Primary | ICD-10-CM

## 2019-01-15 DIAGNOSIS — Z12.39 SCREENING FOR BREAST CANCER: ICD-10-CM

## 2019-01-15 PROCEDURE — G8482 FLU IMMUNIZE ORDER/ADMIN: HCPCS | Performed by: ADVANCED PRACTICE MIDWIFE

## 2019-01-15 PROCEDURE — G0101 CA SCREEN;PELVIC/BREAST EXAM: HCPCS | Performed by: ADVANCED PRACTICE MIDWIFE

## 2019-01-28 RX ORDER — MELOXICAM 7.5 MG/1
7.5 TABLET ORAL DAILY
Qty: 90 TABLET | Refills: 3 | Status: SHIPPED | OUTPATIENT
Start: 2019-01-28

## 2019-02-15 ENCOUNTER — HOSPITAL ENCOUNTER (OUTPATIENT)
Dept: LAB | Age: 69
Discharge: HOME | End: 2019-02-15
Payer: COMMERCIAL

## 2019-02-15 ENCOUNTER — HOSPITAL ENCOUNTER (OUTPATIENT)
Dept: CT IMAGING | Age: 69
Discharge: HOME | End: 2019-02-17
Payer: COMMERCIAL

## 2019-02-15 DIAGNOSIS — C20 RECTAL CANCER (HCC): ICD-10-CM

## 2019-02-15 LAB
ABSOLUTE EOS #: 0.23 K/UL (ref 0–0.44)
ABSOLUTE IMMATURE GRANULOCYTE: 0.04 K/UL (ref 0–0.3)
ABSOLUTE LYMPH #: 1.28 K/UL (ref 1.1–3.7)
ABSOLUTE MONO #: 0.52 K/UL (ref 0.1–1.2)
ALBUMIN SERPL-MCNC: 3.9 G/DL (ref 3.5–5.2)
ALBUMIN/GLOBULIN RATIO: 1.3 (ref 1–2.5)
ALP BLD-CCNC: 111 U/L (ref 35–104)
ALT SERPL-CCNC: 13 U/L (ref 5–33)
ANION GAP SERPL CALCULATED.3IONS-SCNC: 12 MMOL/L (ref 9–17)
AST SERPL-CCNC: 16 U/L
BASOPHILS # BLD: 1 % (ref 0–2)
BASOPHILS ABSOLUTE: 0.04 K/UL (ref 0–0.2)
BILIRUB SERPL-MCNC: 0.45 MG/DL (ref 0.3–1.2)
BUN BLDV-MCNC: 23 MG/DL (ref 8–23)
BUN/CREAT BLD: 20 (ref 9–20)
CALCIUM SERPL-MCNC: 9.4 MG/DL (ref 8.6–10.4)
CARCINOEMBRYONIC ANTIGEN: 107.3 NG/ML
CHLORIDE BLD-SCNC: 104 MMOL/L (ref 98–107)
CO2: 26 MMOL/L (ref 20–31)
CREAT SERPL-MCNC: 1.14 MG/DL (ref 0.5–0.9)
DIFFERENTIAL TYPE: ABNORMAL
EOSINOPHILS RELATIVE PERCENT: 3 % (ref 1–4)
GFR AFRICAN AMERICAN: 57 ML/MIN
GFR NON-AFRICAN AMERICAN: 47 ML/MIN
GFR SERPL CREATININE-BSD FRML MDRD: ABNORMAL ML/MIN/{1.73_M2}
GFR SERPL CREATININE-BSD FRML MDRD: ABNORMAL ML/MIN/{1.73_M2}
GLUCOSE BLD-MCNC: 123 MG/DL (ref 70–99)
HCT VFR BLD CALC: 41.5 % (ref 36.3–47.1)
HEMOGLOBIN: 12.9 G/DL (ref 11.9–15.1)
IMMATURE GRANULOCYTES: 1 %
LYMPHOCYTES # BLD: 17 % (ref 24–43)
MCH RBC QN AUTO: 26 PG (ref 25.2–33.5)
MCHC RBC AUTO-ENTMCNC: 31.1 G/DL (ref 28.4–34.8)
MCV RBC AUTO: 83.5 FL (ref 82.6–102.9)
MONOCYTES # BLD: 7 % (ref 3–12)
NRBC AUTOMATED: 0 PER 100 WBC
PDW BLD-RTO: 14.6 % (ref 11.8–14.4)
PLATELET # BLD: 197 K/UL (ref 138–453)
PLATELET ESTIMATE: ABNORMAL
PMV BLD AUTO: 10.5 FL (ref 8.1–13.5)
POTASSIUM SERPL-SCNC: 3.7 MMOL/L (ref 3.7–5.3)
RBC # BLD: 4.97 M/UL (ref 3.95–5.11)
RBC # BLD: ABNORMAL 10*6/UL
SEG NEUTROPHILS: 71 % (ref 36–65)
SEGMENTED NEUTROPHILS ABSOLUTE COUNT: 5.45 K/UL (ref 1.5–8.1)
SODIUM BLD-SCNC: 142 MMOL/L (ref 135–144)
TOTAL PROTEIN: 6.9 G/DL (ref 6.4–8.3)
WBC # BLD: 7.6 K/UL (ref 3.5–11.3)
WBC # BLD: ABNORMAL 10*3/UL

## 2019-02-15 PROCEDURE — 6360000004 HC RX CONTRAST MEDICATION: Performed by: INTERNAL MEDICINE

## 2019-02-15 PROCEDURE — 36415 COLL VENOUS BLD VENIPUNCTURE: CPT

## 2019-02-15 PROCEDURE — 85025 COMPLETE CBC W/AUTO DIFF WBC: CPT

## 2019-02-15 PROCEDURE — 80053 COMPREHEN METABOLIC PANEL: CPT

## 2019-02-15 PROCEDURE — 71260 CT THORAX DX C+: CPT

## 2019-02-15 PROCEDURE — 82378 CARCINOEMBRYONIC ANTIGEN: CPT

## 2019-02-15 PROCEDURE — 74177 CT ABD & PELVIS W/CONTRAST: CPT

## 2019-02-15 RX ADMIN — IOPAMIDOL 75 ML: 755 INJECTION, SOLUTION INTRAVENOUS at 11:03

## 2019-02-15 RX ADMIN — IOHEXOL 50 ML: 240 INJECTION, SOLUTION INTRATHECAL; INTRAVASCULAR; INTRAVENOUS; ORAL at 11:04

## 2019-02-20 ENCOUNTER — OFFICE VISIT (OUTPATIENT)
Dept: ONCOLOGY | Age: 69
End: 2019-02-20
Payer: COMMERCIAL

## 2019-02-20 VITALS
RESPIRATION RATE: 24 BRPM | HEART RATE: 88 BPM | SYSTOLIC BLOOD PRESSURE: 139 MMHG | DIASTOLIC BLOOD PRESSURE: 70 MMHG | WEIGHT: 289 LBS | BODY MASS INDEX: 46.45 KG/M2 | TEMPERATURE: 97.1 F | HEIGHT: 66 IN

## 2019-02-20 DIAGNOSIS — F41.9 ANXIETY: ICD-10-CM

## 2019-02-20 DIAGNOSIS — C20 RECTAL CANCER (HCC): Primary | ICD-10-CM

## 2019-02-20 PROCEDURE — 1123F ACP DISCUSS/DSCN MKR DOCD: CPT | Performed by: INTERNAL MEDICINE

## 2019-02-20 PROCEDURE — 99214 OFFICE O/P EST MOD 30 MIN: CPT | Performed by: INTERNAL MEDICINE

## 2019-02-20 PROCEDURE — G8399 PT W/DXA RESULTS DOCUMENT: HCPCS | Performed by: INTERNAL MEDICINE

## 2019-02-20 PROCEDURE — G8598 ASA/ANTIPLAT THER USED: HCPCS | Performed by: INTERNAL MEDICINE

## 2019-02-20 PROCEDURE — 1090F PRES/ABSN URINE INCON ASSESS: CPT | Performed by: INTERNAL MEDICINE

## 2019-02-20 PROCEDURE — 1101F PT FALLS ASSESS-DOCD LE1/YR: CPT | Performed by: INTERNAL MEDICINE

## 2019-02-20 PROCEDURE — 3017F COLORECTAL CA SCREEN DOC REV: CPT | Performed by: INTERNAL MEDICINE

## 2019-02-20 PROCEDURE — 4040F PNEUMOC VAC/ADMIN/RCVD: CPT | Performed by: INTERNAL MEDICINE

## 2019-02-20 PROCEDURE — 1036F TOBACCO NON-USER: CPT | Performed by: INTERNAL MEDICINE

## 2019-02-20 PROCEDURE — G8427 DOCREV CUR MEDS BY ELIG CLIN: HCPCS | Performed by: INTERNAL MEDICINE

## 2019-02-20 PROCEDURE — G8417 CALC BMI ABV UP PARAM F/U: HCPCS | Performed by: INTERNAL MEDICINE

## 2019-02-20 PROCEDURE — G8482 FLU IMMUNIZE ORDER/ADMIN: HCPCS | Performed by: INTERNAL MEDICINE

## 2019-02-20 RX ORDER — ALPRAZOLAM 0.5 MG/1
0.5 TABLET ORAL 3 TIMES DAILY PRN
Qty: 90 TABLET | Refills: 1 | Status: SHIPPED | OUTPATIENT
Start: 2019-02-20 | End: 2019-03-22

## 2019-02-25 ENCOUNTER — HOSPITAL ENCOUNTER (OUTPATIENT)
Dept: WOMENS IMAGING | Age: 69
Discharge: HOME | End: 2019-02-27
Payer: COMMERCIAL

## 2019-02-25 DIAGNOSIS — Z12.39 SCREENING FOR BREAST CANCER: ICD-10-CM

## 2019-02-25 PROCEDURE — G0279 TOMOSYNTHESIS, MAMMO: HCPCS

## 2019-03-13 ENCOUNTER — OFFICE VISIT (OUTPATIENT)
Dept: PRIMARY CARE CLINIC | Age: 69
End: 2019-03-13
Payer: COMMERCIAL

## 2019-03-13 VITALS
WEIGHT: 290.8 LBS | HEART RATE: 94 BPM | SYSTOLIC BLOOD PRESSURE: 132 MMHG | DIASTOLIC BLOOD PRESSURE: 75 MMHG | BODY MASS INDEX: 46.94 KG/M2 | TEMPERATURE: 98.2 F

## 2019-03-13 DIAGNOSIS — L03.115 CELLULITIS OF RIGHT LOWER EXTREMITY: Primary | ICD-10-CM

## 2019-03-13 DIAGNOSIS — M79.89 SWELLING OF RIGHT LOWER EXTREMITY: ICD-10-CM

## 2019-03-13 PROCEDURE — 1090F PRES/ABSN URINE INCON ASSESS: CPT | Performed by: NURSE PRACTITIONER

## 2019-03-13 PROCEDURE — G8482 FLU IMMUNIZE ORDER/ADMIN: HCPCS | Performed by: NURSE PRACTITIONER

## 2019-03-13 PROCEDURE — 3017F COLORECTAL CA SCREEN DOC REV: CPT | Performed by: NURSE PRACTITIONER

## 2019-03-13 PROCEDURE — 1123F ACP DISCUSS/DSCN MKR DOCD: CPT | Performed by: NURSE PRACTITIONER

## 2019-03-13 PROCEDURE — 1036F TOBACCO NON-USER: CPT | Performed by: NURSE PRACTITIONER

## 2019-03-13 PROCEDURE — G8399 PT W/DXA RESULTS DOCUMENT: HCPCS | Performed by: NURSE PRACTITIONER

## 2019-03-13 PROCEDURE — 99213 OFFICE O/P EST LOW 20 MIN: CPT | Performed by: NURSE PRACTITIONER

## 2019-03-13 PROCEDURE — 4040F PNEUMOC VAC/ADMIN/RCVD: CPT | Performed by: NURSE PRACTITIONER

## 2019-03-13 PROCEDURE — G8417 CALC BMI ABV UP PARAM F/U: HCPCS | Performed by: NURSE PRACTITIONER

## 2019-03-13 PROCEDURE — G8427 DOCREV CUR MEDS BY ELIG CLIN: HCPCS | Performed by: NURSE PRACTITIONER

## 2019-03-13 PROCEDURE — G8598 ASA/ANTIPLAT THER USED: HCPCS | Performed by: NURSE PRACTITIONER

## 2019-03-13 PROCEDURE — 1101F PT FALLS ASSESS-DOCD LE1/YR: CPT | Performed by: NURSE PRACTITIONER

## 2019-03-13 RX ORDER — CEPHALEXIN 500 MG/1
500 CAPSULE ORAL 4 TIMES DAILY
Qty: 28 CAPSULE | Refills: 0 | Status: SHIPPED | OUTPATIENT
Start: 2019-03-13 | End: 2019-03-20

## 2019-03-14 ASSESSMENT — ENCOUNTER SYMPTOMS
DIARRHEA: 0
SINUS PRESSURE: 0
SHORTNESS OF BREATH: 0
SORE THROAT: 0
CONSTIPATION: 0
WHEEZING: 0
COLOR CHANGE: 1
NAUSEA: 0
SINUS PAIN: 0

## 2019-03-18 ENCOUNTER — HOSPITAL ENCOUNTER (OUTPATIENT)
Dept: VASCULAR LAB | Age: 69
Discharge: HOME | End: 2019-03-20
Payer: COMMERCIAL

## 2019-03-18 ENCOUNTER — TELEPHONE (OUTPATIENT)
Dept: PRIMARY CARE CLINIC | Age: 69
End: 2019-03-18

## 2019-03-18 DIAGNOSIS — M79.89 SWELLING OF RIGHT LOWER EXTREMITY: ICD-10-CM

## 2019-03-18 PROCEDURE — 93971 EXTREMITY STUDY: CPT

## 2019-04-09 ENCOUNTER — OFFICE VISIT (OUTPATIENT)
Dept: UROLOGY | Age: 69
End: 2019-04-09
Payer: COMMERCIAL

## 2019-04-09 ENCOUNTER — HOSPITAL ENCOUNTER (OUTPATIENT)
Age: 69
Setting detail: SPECIMEN
Discharge: HOME | End: 2019-04-09
Payer: COMMERCIAL

## 2019-04-09 VITALS
WEIGHT: 292 LBS | HEART RATE: 78 BPM | HEIGHT: 66 IN | DIASTOLIC BLOOD PRESSURE: 81 MMHG | SYSTOLIC BLOOD PRESSURE: 144 MMHG | BODY MASS INDEX: 46.93 KG/M2

## 2019-04-09 DIAGNOSIS — R31.29 MICROHEMATURIA: Primary | ICD-10-CM

## 2019-04-09 DIAGNOSIS — N13.30 HYDROURETERONEPHROSIS: ICD-10-CM

## 2019-04-09 DIAGNOSIS — R31.29 MICROHEMATURIA: ICD-10-CM

## 2019-04-09 LAB
-: ABNORMAL
AMORPHOUS: ABNORMAL
BACTERIA: ABNORMAL
BILIRUBIN URINE: NEGATIVE
CASTS UA: ABNORMAL /LPF
COLOR: YELLOW
COMMENT UA: ABNORMAL
CRYSTALS, UA: ABNORMAL /HPF
EPITHELIAL CELLS UA: ABNORMAL /HPF (ref 0–25)
GLUCOSE URINE: NEGATIVE
KETONES, URINE: NEGATIVE
LEUKOCYTE ESTERASE, URINE: ABNORMAL
MUCUS: ABNORMAL
NITRITE, URINE: NEGATIVE
OTHER OBSERVATIONS UA: ABNORMAL
PH UA: 6 (ref 5–9)
PROTEIN UA: NEGATIVE
RBC UA: ABNORMAL /HPF (ref 0–2)
RENAL EPITHELIAL, UA: ABNORMAL /HPF
SPECIFIC GRAVITY UA: 1.01 (ref 1.01–1.02)
TRICHOMONAS: ABNORMAL
TURBIDITY: CLEAR
URINE HGB: ABNORMAL
UROBILINOGEN, URINE: NORMAL
WBC UA: ABNORMAL /HPF (ref 0–5)
YEAST: ABNORMAL

## 2019-04-09 PROCEDURE — G8417 CALC BMI ABV UP PARAM F/U: HCPCS | Performed by: UROLOGY

## 2019-04-09 PROCEDURE — 81001 URINALYSIS AUTO W/SCOPE: CPT

## 2019-04-09 PROCEDURE — 99213 OFFICE O/P EST LOW 20 MIN: CPT | Performed by: UROLOGY

## 2019-04-09 PROCEDURE — 4040F PNEUMOC VAC/ADMIN/RCVD: CPT | Performed by: UROLOGY

## 2019-04-09 PROCEDURE — 1036F TOBACCO NON-USER: CPT | Performed by: UROLOGY

## 2019-04-09 PROCEDURE — 1123F ACP DISCUSS/DSCN MKR DOCD: CPT | Performed by: UROLOGY

## 2019-04-09 PROCEDURE — 87086 URINE CULTURE/COLONY COUNT: CPT

## 2019-04-09 PROCEDURE — G8427 DOCREV CUR MEDS BY ELIG CLIN: HCPCS | Performed by: UROLOGY

## 2019-04-09 PROCEDURE — G8399 PT W/DXA RESULTS DOCUMENT: HCPCS | Performed by: UROLOGY

## 2019-04-09 PROCEDURE — 3017F COLORECTAL CA SCREEN DOC REV: CPT | Performed by: UROLOGY

## 2019-04-09 PROCEDURE — G8598 ASA/ANTIPLAT THER USED: HCPCS | Performed by: UROLOGY

## 2019-04-09 PROCEDURE — 1090F PRES/ABSN URINE INCON ASSESS: CPT | Performed by: UROLOGY

## 2019-04-09 ASSESSMENT — ENCOUNTER SYMPTOMS
BACK PAIN: 0
EYE REDNESS: 0
NAUSEA: 0
COUGH: 0
COLOR CHANGE: 0
VOMITING: 0
WHEEZING: 0
EYE PAIN: 0
SHORTNESS OF BREATH: 0
ABDOMINAL PAIN: 0

## 2019-04-09 NOTE — PATIENT INSTRUCTIONS
SURVEY:    You may be receiving a survey from [employer] regarding your visit today. Please complete the survey to enable us to provide the highest quality of care to you and your family. If you cannot score us a very good on any question, please call the office to discuss how we could have made your experience a very good one. Thank you.

## 2019-04-09 NOTE — PROGRESS NOTES
HPI:    Patient is a 78 y.o. female in no acute distress. She is alert and oriented to person, place, and time. History  Right ureteral obstruction secondary to colon cancer (hx rectosigmoid vaginal fistula, tx with colectomy & end colostomy & chemo/radiation). Completed her cancer therapy. Stent was removed May 2014. Hydronephrosis and renal insufficiency persisted after stent removal. Follows with Dr Imani Daniel for this.      4/2017 microscopic hematuria. Cystoscopy showed squamous metaplasia. Currently  Patient is here today for annual follow-up. Patient has had recent imaging. We did independently review her CT scan today. This does show stable consistent hydroureteronephrosis on the right. Patient's renal function does remain adequate. She does see nephrology on a regular basis. Patient has had no gross hematuria dysuria. She reports no pain today. Patient does report an elevation in her CEA. She also has a mass identified on the CT scan. She is going for removal of this mass later on this month. I did explain to her that I would be happy to discuss her hydronephrosis with any of the physicians. Past Medical History:   Diagnosis Date    Abnormal cardiac cath 10/11/2012     [id number]% mid vessel stenosis. 50-60% eccentric proximal stenosis was seen and a 70% stenosis at the 1st diagonal branch of the circumflex.      Anxiety     Asthma     CAD (coronary artery disease)     CKD (chronic kidney disease) stage 3, GFR 30-59 ml/min ([facility]) 11/19/2014    Colovaginal fistula 8/2012    Depression     Hyperlipidemia     Hypertension     IBS (irritable bowel syndrome)     Morbid (severe) obesity due to excess calories ([address] 12/7/2015    Multiple allergies     Obesity     Obesity due to excess calories 6/6/2016    LUI (obstructive sleep apnea) 6/8/2015    Rectal cancer ([address] 2012    SIRS due to infectious process without acute organ dysfunction (HCC)     Sleep apnea     Unspecified cerebral artery occlusion with cerebral infarction     states that she had on 10/19/12, resulted in left sided  weakness     Past Surgical History:   Procedure Laterality Date    ABDOMEN SURGERY      CARPAL TUNNEL RELEASE      Right     CHOLECYSTECTOMY  1992    COLECTOMY  2012    colovaginal fistula    COLONOSCOPY      several    COLONOSCOPY  08-    COLONOSCOPY  2/22/2016    -normal    COLONOSCOPY  04/16/2018    -normal    CYSTOSCOPY      attempted rt. ureteral stent removal    CYSTOSCOPY  12/11/13    stent exchange    CYSTOURETHROSCOPY Right     w/ stent placemement    CYSTOURETHROSCOPY/STENT REMOVAL Right 03/25/2013    new stent inserted    CYSTOURETHROSCOPY/STENT REMOVAL Right 5/20/14    DILATION AND CURETTAGE OF UTERUS      HYSTERECTOMY, TOTAL ABDOMINAL  2012    KIDNEY SURGERY Right 2012    shunt     KNEE SURGERY      Right and Left    OTHER SURGICAL HISTORY  08-    Port removal     [place] COLONOSCOPY FLX DX W/COLLJ SPEC WHEN PFRMD N/A 4/16/2018    COLONOS[facility] performed by Marcin Rangel MD [facility]      Left    TUBAL LIGATION      TUNNELED VENOUS PORT PLACEMENT  2013     Outpatient Encounter Medications as of 4/9/2019   Medication Sig Dispense Refill    ALPRAZolam (XANAX PO) Take by mouth 3 times daily as needed      meloxicam (MOBIC) 7.5 MG tablet Take 1 tablet by mouth daily 90 tablet 3    hydrochlorothiazide (MICROZIDE) 12.5 MG capsule Take 1 capsule by mouth daily 90 capsule 3    losartan (COZAAR) 100 MG tablet Take 1 tablet by mouth daily 90 tablet 3    citalopram (CELEXA) 40 MG tablet Take 1 tablet by mouth daily 90 tablet 3    atorvastatin (LIPITOR) 40 MG tablet Take 1 tablet by mouth daily 90 tablet 3    fluticasone-salmeterol (ADVAIR HFA) 115-21 MCG/ACT inhaler Inhale 1 puff into the lungs 2 times daily 3 Inhaler 3    nystatin (MYCOSTATIN) 863318 UNIT/GM powder Apply topically 4 times daily.  1 Bottle 3    albuterol sulfate HFA (PROAIR HFA) 108 (90 Base) MCG/ACT inhaler Inhale 2 puffs into the lungs every 6 hours as needed for Wheezing 1 Inhaler 3    clopidogrel (PLAVIX) 75 MG tablet Take 1 tablet by mouth daily 90 tablet 3    Misc. Devices (WALKER) MISC 1 each by Does not apply route daily 1 each 0    Elastic Bandages & Supports (KNEE BRACE/HINGED BARS XL) MISC 1 each by Does not apply route daily 1 each 0    Ostomy Supplies (MIGDALIA-FIT NATURA DRAINABLE POUCH) Pouch MISC USE AS DIRECTED 20 each 5    Ostomy Supplies (MIGDALIA-FIT NATURA DURAHESIVE 45MM) MISC USE AS DIRECTED 20 Bottle 5    Ostomy Supplies (BRAVA MOLDABLE RING) MISC USE AS DIRECTED 20 each 5    Cholecalciferol (VITAMIN D) 2000 UNITS CAPS capsule Take 1 capsule by mouth daily       calcitRIOL (ROCALTROL) 0.25 MCG capsule Take 0.25 mcg by mouth daily.  aspirin EC 81 MG EC tablet Take 1 tablet by mouth daily.  levetiracetam (KEPPRA) 500 MG tablet Take 500 mg by mouth 2 times daily.  [DISCONTINUED] LORazepam (ATIVAN) 0.5 MG tablet Take 1 tablet by mouth 2 times daily as needed for Anxiety 60 tablet 2     No facility-administered encounter medications on file as of 4/9/2019. Current Outpatient Medications on File Prior to Visit   Medication Sig Dispense Refill    ALPRAZolam (XANAX PO) Take by mouth 3 times daily as needed      meloxicam (MOBIC) 7.5 MG tablet Take 1 tablet by mouth daily 90 tablet 3    hydrochlorothiazide (MICROZIDE) 12.5 MG capsule Take 1 capsule by mouth daily 90 capsule 3    losartan (COZAAR) 100 MG tablet Take 1 tablet by mouth daily 90 tablet 3    citalopram (CELEXA) 40 MG tablet Take 1 tablet by mouth daily 90 tablet 3    atorvastatin (LIPITOR) 40 MG tablet Take 1 tablet by mouth daily 90 tablet 3    fluticasone-salmeterol (ADVAIR HFA) 115-21 MCG/ACT inhaler Inhale 1 puff into the lungs 2 times daily 3 Inhaler 3    nystatin (MYCOSTATIN) 326492 UNIT/GM powder Apply topically 4 times daily.  1 Bottle 3    albuterol sulfate HFA (PROAIR HFA) 108 (90 Base) MCG/ACT inhaler Inhale 2 puffs into the lungs every 6 hours as needed for Wheezing 1 Inhaler 3    clopidogrel (PLAVIX) 75 MG tablet Take 1 tablet by mouth daily 90 tablet 3    Misc. Devices (WALKER) MISC 1 each by Does not apply route daily 1 each 0    Elastic Bandages & Supports (KNEE BRACE/HINGED BARS XL) MISC 1 each by Does not apply route daily 1 each 0    Ostomy Supplies (MIGDALIA-FIT NATURA DRAINABLE POUCH) Pouch MISC USE AS DIRECTED 20 each 5    Ostomy Supplies (MIGDALIA-FIT NATURA DURAHESIVE 45MM) MISC USE AS DIRECTED 20 Bottle 5    Ostomy Supplies (BRAVA MOLDABLE RING) MISC USE AS DIRECTED 20 each 5    Cholecalciferol (VITAMIN D) 2000 UNITS CAPS capsule Take 1 capsule by mouth daily       calcitRIOL (ROCALTROL) 0.25 MCG capsule Take 0.25 mcg by mouth daily.  aspirin EC 81 MG EC tablet Take 1 tablet by mouth daily.  levetiracetam (KEPPRA) 500 MG tablet Take 500 mg by mouth 2 times daily. No current facility-administered medications on file prior to visit. Zithromax [azithromycin dihydrate] and Seasonal  Family History   Problem Relation Age of Onset    High Blood Pressure Mother     Heart Disease Mother          of CHF    Cancer Father         carcinoma of lung     Social History     Tobacco Use   Smoking Status Former Smoker    Packs/day: 0.50    Years: 5.00    Pack years: 2.50    Types: Cigarettes    Last attempt to quit: 1992    Years since quittin.6   Smokeless Tobacco Never Used       Social History     Substance and Sexual Activity   Alcohol Use No       Review of Systems   Constitutional: Negative for appetite change, chills and fever. Eyes: Negative for pain, redness and visual disturbance. Respiratory: Negative for cough, shortness of breath and wheezing. Cardiovascular: Negative for chest pain and leg swelling. Gastrointestinal: Negative for abdominal pain, nausea and vomiting.    Genitourinary: Negative for difficulty urinating, dysuria, flank pain, frequency, hematuria, pelvic pain, vaginal bleeding and vaginal discharge. Musculoskeletal: Negative for back pain, joint swelling and myalgias. Skin: Negative for color change, rash and wound. Neurological: Negative for dizziness, tremors and numbness. Hematological: Negative for adenopathy. Does not bruise/bleed easily. BP (!) 144/81 (Site: Right Upper Arm, Position: Sitting, Cuff Size: Large Adult)   Pulse 78   Ht 5' 6\" (1.676 m)   Wt 292 lb (132.5 kg)   BMI 47.13 kg/m²       PHYSICAL EXAM:  Constitutional: Patient resting comfortably, in no acute distress. Neuro: Alert and oriented to person place and time. Cranial nerves grossly intact. Psych: Mood and affect normal.  Skin: Warm, dry  HEENT: normocephalic, atraumatic  Lymphatics: No palpable lymphadenopathy  Lungs: Respiratory effort normal, unlabored  Cardiovascular:  Normal peripheral pulses  Abdomen: Soft, non-tender, non-distended with no organomegaly or palpable masses. : No CVA tenderness. Bladder non-tender and not distended. Pelvic: deferred    Lab Results   Component Value Date    BUN 23 02/15/2019     Lab Results   Component Value Date    CREATININE 1.14 (H) 02/15/2019       ASSESSMENT:  This is a 78 y.o. female with the following diagnoses:   Diagnosis Orders   1. Microhematuria  Urinalysis with Microscopic    Urine culture clean catch   2. Hydroureteronephrosis           PLAN:  We will check patient's urine today. She will follow up with us in 1 year.

## 2019-04-10 LAB
CULTURE: NORMAL
Lab: NORMAL
SPECIMEN DESCRIPTION: NORMAL

## 2019-04-12 ENCOUNTER — TELEPHONE (OUTPATIENT)
Dept: UROLOGY | Age: 69
End: 2019-04-12

## 2019-04-12 NOTE — TELEPHONE ENCOUNTER
----- Message from KIAH Ordonez - CNP sent at 4/12/2019  8:32 AM EDT -----  Call pt - urine cx reviewed and negative for UTI & for significant microhematuria

## 2019-05-01 ENCOUNTER — TELEPHONE (OUTPATIENT)
Dept: PRIMARY CARE CLINIC | Age: 69
End: 2019-05-01

## 2019-05-01 DIAGNOSIS — Z79.01 ANTICOAGULATED ON COUMADIN: Primary | ICD-10-CM

## 2019-05-01 NOTE — TELEPHONE ENCOUNTER
Received phone call from Codie Garza CNP @ [facility] in [place] re: patient wanting coumadin clinic done in [place], [place]. Health Maintenance   Topic Date Due    DTaP/Tdap/Td vaccine (1 - Tdap) 02/18/1968    Shingles Vaccine (1 of 2) 02/18/1999    Diabetes screen  10/13/2015    Potassium monitoring  02/15/2020    Creatinine monitoring  02/15/2020    Breast cancer screen  02/25/2021    Colon cancer screen colonoscopy  04/16/2023    Lipid screen  04/23/2023    Flu vaccine  Completed    Pneumococcal 65+ years Vaccine  Completed    Hepatitis C screen  Completed    DEXA (modify frequency per FRAX score)  Addressed             (applicable per patient's age: Cancer Screenings, Depression Screening, Fall Risk Screening, Immunizations)    Hemoglobin A1C (%)   Date Value   10/13/2012 4.7     Microalb/Crt.  Ratio (mcg/mg creat)   Date Value   04/23/2018 CANNOT BE CALCULATED     LDL Cholesterol (mg/dL)   Date Value   04/23/2018 78     AST (U/L)   Date Value   02/15/2019 16     ALT (U/L)   Date Value   02/15/2019 13     BUN (mg/dL)   Date Value   02/15/2019 23      (goal A1C is < 7)   (goal LDL is <100) need 30-50% reduction from baseline     BP Readings from Last 3 Encounters:   04/09/19 (!) 144/81   03/13/19 132/75   02/20/19 139/70    (goal /80)      All Future Testing planned in [facility]:  Lab Frequency Next Occurrence   CBC Auto Differential Once 05/15/2019   Comprehensive Metabolic Panel Once 56/56/3877   Microalbumin, Ur Once 05/15/2019   Lipid Panel Once 05/15/2019       Next Visit Date:  Future Appointments   Date Time Provider [place]   4/8/2020 11:15 AM MD Jimmy Lara Urol [facility]            Patient Active Problem List:     IBS (irritable bowel syndrome)     Anxiety     Rectal cancer ([facility] [address]     Ureteral stricture, right     H/O resection of rectum     CAD (coronary artery disease)     CKD (chronic kidney disease) stage 3, GFR 30-59 ml/min (HCC)     LUI (obstructive sleep apnea)     Malignant neoplasm of large intestine (HCC)     Depression     Hydroureteronephrosis     History of decompression of median nerve     Morbid (severe) obesity due to excess calories (HCC)     History of rectal cancer     Mild intermittent asthma without complication     Essential hypertension     Mixed hyperlipidemia     Elevated CEA     Colostomy in place [facility])     Abnormal positron emission tomography (PET) scan     Microhematuria

## 2019-05-01 NOTE — TELEPHONE ENCOUNTER
[facility] and was transferred to the anticoagulation clinic and left message that pt would like to have her coumadin monitored there and for them to call our office for further information.

## 2021-10-25 NOTE — TELEPHONE ENCOUNTER
Nanette called back-- discussed plan with her.   No further questions and in agreement with the plan.    Luz Granda RN  [facility]  [phone]     Patient was advised of results.

## 2024-12-15 NOTE — PROGRESS NOTES
Name: Caden Dale  : 1950         Chief Complaint:     Chief Complaint   Patient presents with    Check-Up    Asthma    Hypertension    Medication Refill       History of Present Illness:      Caden Dale is a 70 y.o.  female who presents with Check-Up; Asthma; Hypertension; and Medication Refill    Have you seen any other physician or provider since your last visit yes - Dr Job Greenwood, dermatologist    Have you had any other diagnostic tests since your last visit? no    Have you changed or stopped any medications since your last visit including any over-the-counter medicines, vitamins, or herbal medicines? no     Are you taking all your prescribed medications? Yes  If NO, why? -  N/A           Patient Self-Management Goal for this visit. What is your goal for your visit today? - Improve health   Barriers to success: none   Plan for overcoming my barriers: N/A      Confidence: 9/10   Date goal set: 10/25/18   Date expected to reach goal: 1month      Health Maintenance Due   Topic Date Due    Diabetes screen  10/13/2015     Jair Arriaga is here today for a routine office visit. Hypertension   This is a chronic problem. The current episode started more than 1 year ago. The problem is unchanged. The problem is controlled. Associated symptoms include anxiety (CHRONIC) and shortness of breath (WITH EXERTION). Pertinent negatives include no blurred vision, chest pain, headaches, palpitations or peripheral edema. There are no associated agents to hypertension. Risk factors for coronary artery disease include obesity, post-menopausal state, family history, dyslipidemia, sedentary lifestyle and stress. Past treatments include angiotensin blockers and diuretics. The current treatment provides moderate improvement. Compliance problems include exercise and diet. Hypertensive end-organ damage includes kidney disease and CVA. There is no history of CAD/MI or heart failure.  Identifiable causes of hypertension 1 1

## (undated) DEVICE — NEEDLE 25GAX5.0MM INJ CARR LOCKE

## (undated) DEVICE — CANNULA ORAL NSL AD CO2 N INTUB O2 DEL DISP TRU LNK

## (undated) DEVICE — SOLUTION IV IRRIG POUR BRL 0.9% SODIUM CHL 2F7124

## (undated) DEVICE — MEDI-VAC NON-CONDUCTIVE TUBING7MM X 30.5 (100FT): Brand: CARDINAL HEALTH